# Patient Record
Sex: FEMALE | Race: WHITE | NOT HISPANIC OR LATINO | Employment: FULL TIME | URBAN - METROPOLITAN AREA
[De-identification: names, ages, dates, MRNs, and addresses within clinical notes are randomized per-mention and may not be internally consistent; named-entity substitution may affect disease eponyms.]

---

## 2020-07-19 ENCOUNTER — HOSPITAL ENCOUNTER (OUTPATIENT)
Facility: CLINIC | Age: 20
Setting detail: OBSERVATION
Discharge: HOME | End: 2020-07-20
Attending: EMERGENCY MEDICINE | Admitting: FAMILY MEDICINE
Payer: COMMERCIAL

## 2020-07-19 ENCOUNTER — APPOINTMENT (OUTPATIENT)
Dept: CT IMAGING | Facility: CLINIC | Age: 20
End: 2020-07-19
Attending: EMERGENCY MEDICINE
Payer: COMMERCIAL

## 2020-07-19 DIAGNOSIS — N13.2 HYDRONEPHROSIS WITH RENAL AND URETERAL CALCULUS OBSTRUCTION: ICD-10-CM

## 2020-07-19 DIAGNOSIS — N20.1 URETEROLITHIASIS: ICD-10-CM

## 2020-07-19 DIAGNOSIS — N12 PYELONEPHRITIS: ICD-10-CM

## 2020-07-19 DIAGNOSIS — N13.6 PYONEPHROSIS: ICD-10-CM

## 2020-07-19 DIAGNOSIS — Z20.822 COVID-19 RULED OUT BY LABORATORY TESTING: ICD-10-CM

## 2020-07-19 LAB
ALBUMIN UR-MCNC: 30 MG/DL
ANION GAP SERPL CALCULATED.3IONS-SCNC: 8 MMOL/L (ref 3–14)
APPEARANCE UR: CLEAR
BACTERIA #/AREA URNS HPF: ABNORMAL /HPF
BASOPHILS # BLD AUTO: 0 10E9/L (ref 0–0.2)
BASOPHILS NFR BLD AUTO: 0 %
BILIRUB UR QL STRIP: NEGATIVE
BUN SERPL-MCNC: 16 MG/DL (ref 7–30)
CALCIUM SERPL-MCNC: 8.5 MG/DL (ref 8.5–10.1)
CHLORIDE SERPL-SCNC: 106 MMOL/L (ref 94–109)
CO2 SERPL-SCNC: 26 MMOL/L (ref 20–32)
COLOR UR AUTO: YELLOW
CREAT SERPL-MCNC: 1.22 MG/DL (ref 0.52–1.04)
DIFFERENTIAL METHOD BLD: ABNORMAL
EOSINOPHIL # BLD AUTO: 0.1 10E9/L (ref 0–0.7)
EOSINOPHIL NFR BLD AUTO: 0.4 %
ERYTHROCYTE [DISTWIDTH] IN BLOOD BY AUTOMATED COUNT: 12.7 % (ref 10–15)
GFR SERPL CREATININE-BSD FRML MDRD: 63 ML/MIN/{1.73_M2}
GLUCOSE SERPL-MCNC: 97 MG/DL (ref 70–99)
GLUCOSE UR STRIP-MCNC: NEGATIVE MG/DL
HCG SERPL QL: NEGATIVE
HCT VFR BLD AUTO: 40.3 % (ref 35–47)
HGB BLD-MCNC: 13 G/DL (ref 11.7–15.7)
HGB UR QL STRIP: ABNORMAL
IMM GRANULOCYTES # BLD: 0 10E9/L (ref 0–0.4)
IMM GRANULOCYTES NFR BLD: 0.3 %
KETONES UR STRIP-MCNC: NEGATIVE MG/DL
LABORATORY COMMENT REPORT: NORMAL
LACTATE BLD-SCNC: 0.9 MMOL/L (ref 0.7–2)
LEUKOCYTE ESTERASE UR QL STRIP: NEGATIVE
LYMPHOCYTES # BLD AUTO: 1.4 10E9/L (ref 0.8–5.3)
LYMPHOCYTES NFR BLD AUTO: 9.2 %
MCH RBC QN AUTO: 29.1 PG (ref 26.5–33)
MCHC RBC AUTO-ENTMCNC: 32.3 G/DL (ref 31.5–36.5)
MCV RBC AUTO: 90 FL (ref 78–100)
MONOCYTES # BLD AUTO: 1 10E9/L (ref 0–1.3)
MONOCYTES NFR BLD AUTO: 6.6 %
MUCOUS THREADS #/AREA URNS LPF: PRESENT /LPF
NEUTROPHILS # BLD AUTO: 12.8 10E9/L (ref 1.6–8.3)
NEUTROPHILS NFR BLD AUTO: 83.5 %
NITRATE UR QL: NEGATIVE
NRBC # BLD AUTO: 0 10*3/UL
NRBC BLD AUTO-RTO: 0 /100
PH UR STRIP: 5.5 PH (ref 5–7)
PLATELET # BLD AUTO: 343 10E9/L (ref 150–450)
POTASSIUM SERPL-SCNC: 4.1 MMOL/L (ref 3.4–5.3)
RBC # BLD AUTO: 4.47 10E12/L (ref 3.8–5.2)
RBC #/AREA URNS AUTO: 26 /HPF (ref 0–2)
SARS-COV-2 RNA SPEC QL NAA+PROBE: NEGATIVE
SARS-COV-2 RNA SPEC QL NAA+PROBE: NORMAL
SODIUM SERPL-SCNC: 140 MMOL/L (ref 133–144)
SOURCE: ABNORMAL
SP GR UR STRIP: 1.03 (ref 1–1.03)
SPECIMEN SOURCE: NORMAL
SPECIMEN SOURCE: NORMAL
TRANS CELLS #/AREA URNS HPF: <1 /HPF (ref 0–1)
UROBILINOGEN UR STRIP-MCNC: NORMAL MG/DL (ref 0–2)
WBC # BLD AUTO: 15.3 10E9/L (ref 4–11)
WBC #/AREA URNS AUTO: 6 /HPF (ref 0–5)

## 2020-07-19 PROCEDURE — 25000128 H RX IP 250 OP 636: Performed by: EMERGENCY MEDICINE

## 2020-07-19 PROCEDURE — 83605 ASSAY OF LACTIC ACID: CPT | Performed by: EMERGENCY MEDICINE

## 2020-07-19 PROCEDURE — 96365 THER/PROPH/DIAG IV INF INIT: CPT | Performed by: EMERGENCY MEDICINE

## 2020-07-19 PROCEDURE — 81001 URINALYSIS AUTO W/SCOPE: CPT | Performed by: EMERGENCY MEDICINE

## 2020-07-19 PROCEDURE — 96375 TX/PRO/DX INJ NEW DRUG ADDON: CPT

## 2020-07-19 PROCEDURE — 25000128 H RX IP 250 OP 636: Performed by: FAMILY MEDICINE

## 2020-07-19 PROCEDURE — 87088 URINE BACTERIA CULTURE: CPT | Performed by: EMERGENCY MEDICINE

## 2020-07-19 PROCEDURE — 80048 BASIC METABOLIC PNL TOTAL CA: CPT | Performed by: EMERGENCY MEDICINE

## 2020-07-19 PROCEDURE — 85025 COMPLETE CBC W/AUTO DIFF WBC: CPT | Performed by: EMERGENCY MEDICINE

## 2020-07-19 PROCEDURE — 96366 THER/PROPH/DIAG IV INF ADDON: CPT | Performed by: EMERGENCY MEDICINE

## 2020-07-19 PROCEDURE — U0003 INFECTIOUS AGENT DETECTION BY NUCLEIC ACID (DNA OR RNA); SEVERE ACUTE RESPIRATORY SYNDROME CORONAVIRUS 2 (SARS-COV-2) (CORONAVIRUS DISEASE [COVID-19]), AMPLIFIED PROBE TECHNIQUE, MAKING USE OF HIGH THROUGHPUT TECHNOLOGIES AS DESCRIBED BY CMS-2020-01-R: HCPCS | Performed by: STUDENT IN AN ORGANIZED HEALTH CARE EDUCATION/TRAINING PROGRAM

## 2020-07-19 PROCEDURE — 25000132 ZZH RX MED GY IP 250 OP 250 PS 637: Performed by: NURSE PRACTITIONER

## 2020-07-19 PROCEDURE — 25800030 ZZH RX IP 258 OP 636: Performed by: NURSE PRACTITIONER

## 2020-07-19 PROCEDURE — 74176 CT ABD & PELVIS W/O CONTRAST: CPT

## 2020-07-19 PROCEDURE — 87186 SC STD MICRODIL/AGAR DIL: CPT | Performed by: EMERGENCY MEDICINE

## 2020-07-19 PROCEDURE — 99285 EMERGENCY DEPT VISIT HI MDM: CPT | Mod: 25 | Performed by: EMERGENCY MEDICINE

## 2020-07-19 PROCEDURE — 87086 URINE CULTURE/COLONY COUNT: CPT | Performed by: EMERGENCY MEDICINE

## 2020-07-19 PROCEDURE — 99220 ZZC INITIAL OBSERVATION CARE,LEVL III: CPT | Mod: Z6 | Performed by: NURSE PRACTITIONER

## 2020-07-19 PROCEDURE — C9803 HOPD COVID-19 SPEC COLLECT: HCPCS | Performed by: EMERGENCY MEDICINE

## 2020-07-19 PROCEDURE — 25000128 H RX IP 250 OP 636: Performed by: NURSE PRACTITIONER

## 2020-07-19 PROCEDURE — 96376 TX/PRO/DX INJ SAME DRUG ADON: CPT | Performed by: EMERGENCY MEDICINE

## 2020-07-19 PROCEDURE — 96361 HYDRATE IV INFUSION ADD-ON: CPT | Performed by: EMERGENCY MEDICINE

## 2020-07-19 PROCEDURE — G0378 HOSPITAL OBSERVATION PER HR: HCPCS

## 2020-07-19 PROCEDURE — 96361 HYDRATE IV INFUSION ADD-ON: CPT

## 2020-07-19 PROCEDURE — 25000132 ZZH RX MED GY IP 250 OP 250 PS 637: Performed by: FAMILY MEDICINE

## 2020-07-19 PROCEDURE — 25800030 ZZH RX IP 258 OP 636: Performed by: EMERGENCY MEDICINE

## 2020-07-19 PROCEDURE — 84703 CHORIONIC GONADOTROPIN ASSAY: CPT | Performed by: EMERGENCY MEDICINE

## 2020-07-19 PROCEDURE — 96375 TX/PRO/DX INJ NEW DRUG ADDON: CPT | Performed by: EMERGENCY MEDICINE

## 2020-07-19 RX ORDER — ACETAMINOPHEN 650 MG/1
650 SUPPOSITORY RECTAL EVERY 4 HOURS PRN
Status: DISCONTINUED | OUTPATIENT
Start: 2020-07-19 | End: 2020-07-20 | Stop reason: HOSPADM

## 2020-07-19 RX ORDER — ACETAMINOPHEN 325 MG/1
650 TABLET ORAL EVERY 4 HOURS PRN
Status: DISCONTINUED | OUTPATIENT
Start: 2020-07-19 | End: 2020-07-20 | Stop reason: HOSPADM

## 2020-07-19 RX ORDER — SODIUM CHLORIDE 9 MG/ML
INJECTION, SOLUTION INTRAVENOUS CONTINUOUS
Status: DISCONTINUED | OUTPATIENT
Start: 2020-07-19 | End: 2020-07-20 | Stop reason: HOSPADM

## 2020-07-19 RX ORDER — HYDROMORPHONE HYDROCHLORIDE 1 MG/ML
0.5 INJECTION, SOLUTION INTRAMUSCULAR; INTRAVENOUS; SUBCUTANEOUS
Status: DISCONTINUED | OUTPATIENT
Start: 2020-07-19 | End: 2020-07-19 | Stop reason: CLARIF

## 2020-07-19 RX ORDER — TAMSULOSIN HYDROCHLORIDE 0.4 MG/1
0.4 CAPSULE ORAL DAILY
Status: DISCONTINUED | OUTPATIENT
Start: 2020-07-20 | End: 2020-07-19

## 2020-07-19 RX ORDER — ONDANSETRON 2 MG/ML
4 INJECTION INTRAMUSCULAR; INTRAVENOUS EVERY 6 HOURS PRN
Status: DISCONTINUED | OUTPATIENT
Start: 2020-07-19 | End: 2020-07-20 | Stop reason: HOSPADM

## 2020-07-19 RX ORDER — HYDROMORPHONE HCL/0.9% NACL/PF 0.2MG/0.2
0.2 SYRINGE (ML) INTRAVENOUS EVERY 4 HOURS PRN
Status: COMPLETED | OUTPATIENT
Start: 2020-07-19 | End: 2020-07-20

## 2020-07-19 RX ORDER — HYDROMORPHONE HYDROCHLORIDE 1 MG/ML
0.5 INJECTION, SOLUTION INTRAMUSCULAR; INTRAVENOUS; SUBCUTANEOUS
Status: COMPLETED | OUTPATIENT
Start: 2020-07-19 | End: 2020-07-19

## 2020-07-19 RX ORDER — ONDANSETRON 4 MG/1
4 TABLET, ORALLY DISINTEGRATING ORAL EVERY 6 HOURS PRN
Status: DISCONTINUED | OUTPATIENT
Start: 2020-07-19 | End: 2020-07-20 | Stop reason: HOSPADM

## 2020-07-19 RX ORDER — HYDROMORPHONE HYDROCHLORIDE 1 MG/ML
0.5 INJECTION, SOLUTION INTRAMUSCULAR; INTRAVENOUS; SUBCUTANEOUS ONCE
Status: COMPLETED | OUTPATIENT
Start: 2020-07-19 | End: 2020-07-19

## 2020-07-19 RX ORDER — ONDANSETRON 2 MG/ML
4 INJECTION INTRAMUSCULAR; INTRAVENOUS EVERY 30 MIN PRN
Status: DISCONTINUED | OUTPATIENT
Start: 2020-07-19 | End: 2020-07-19

## 2020-07-19 RX ORDER — KETOROLAC TROMETHAMINE 30 MG/ML
30 INJECTION, SOLUTION INTRAMUSCULAR; INTRAVENOUS EVERY 6 HOURS PRN
Status: DISCONTINUED | OUTPATIENT
Start: 2020-07-19 | End: 2020-07-20 | Stop reason: HOSPADM

## 2020-07-19 RX ORDER — KETOROLAC TROMETHAMINE 30 MG/ML
30 INJECTION, SOLUTION INTRAMUSCULAR; INTRAVENOUS EVERY 6 HOURS PRN
Status: DISCONTINUED | OUTPATIENT
Start: 2020-07-19 | End: 2020-07-19

## 2020-07-19 RX ORDER — HYDROCODONE BITARTRATE AND ACETAMINOPHEN 5; 325 MG/1; MG/1
1 TABLET ORAL EVERY 6 HOURS PRN
COMMUNITY
End: 2020-10-06

## 2020-07-19 RX ORDER — TAMSULOSIN HYDROCHLORIDE 0.4 MG/1
0.4 CAPSULE ORAL DAILY
Status: DISCONTINUED | OUTPATIENT
Start: 2020-07-19 | End: 2020-07-20 | Stop reason: HOSPADM

## 2020-07-19 RX ORDER — NALOXONE HYDROCHLORIDE 0.4 MG/ML
.1-.4 INJECTION, SOLUTION INTRAMUSCULAR; INTRAVENOUS; SUBCUTANEOUS
Status: DISCONTINUED | OUTPATIENT
Start: 2020-07-19 | End: 2020-07-20 | Stop reason: HOSPADM

## 2020-07-19 RX ORDER — LIDOCAINE 40 MG/G
CREAM TOPICAL
Status: DISCONTINUED | OUTPATIENT
Start: 2020-07-19 | End: 2020-07-20 | Stop reason: HOSPADM

## 2020-07-19 RX ORDER — CEFTRIAXONE 1 G/1
1 INJECTION, POWDER, FOR SOLUTION INTRAMUSCULAR; INTRAVENOUS ONCE
Status: COMPLETED | OUTPATIENT
Start: 2020-07-19 | End: 2020-07-19

## 2020-07-19 RX ADMIN — SODIUM CHLORIDE 1000 ML: 9 INJECTION, SOLUTION INTRAVENOUS at 07:55

## 2020-07-19 RX ADMIN — ACETAMINOPHEN 650 MG: 325 TABLET, FILM COATED ORAL at 18:47

## 2020-07-19 RX ADMIN — SODIUM CHLORIDE: 9 INJECTION, SOLUTION INTRAVENOUS at 09:17

## 2020-07-19 RX ADMIN — ACETAMINOPHEN 650 MG: 325 TABLET, FILM COATED ORAL at 22:22

## 2020-07-19 RX ADMIN — TAMSULOSIN HYDROCHLORIDE 0.4 MG: 0.4 CAPSULE ORAL at 13:54

## 2020-07-19 RX ADMIN — ONDANSETRON 4 MG: 2 INJECTION INTRAMUSCULAR; INTRAVENOUS at 07:46

## 2020-07-19 RX ADMIN — SODIUM CHLORIDE: 9 INJECTION, SOLUTION INTRAVENOUS at 17:21

## 2020-07-19 RX ADMIN — CEFTRIAXONE SODIUM 1 G: 1 INJECTION, POWDER, FOR SOLUTION INTRAMUSCULAR; INTRAVENOUS at 07:55

## 2020-07-19 RX ADMIN — HYDROMORPHONE HYDROCHLORIDE 0.5 MG: 1 INJECTION, SOLUTION INTRAMUSCULAR; INTRAVENOUS; SUBCUTANEOUS at 07:43

## 2020-07-19 RX ADMIN — SODIUM CHLORIDE: 9 INJECTION, SOLUTION INTRAVENOUS at 22:22

## 2020-07-19 RX ADMIN — KETOROLAC TROMETHAMINE 30 MG: 30 INJECTION, SOLUTION INTRAMUSCULAR at 18:48

## 2020-07-19 RX ADMIN — SODIUM CHLORIDE: 9 INJECTION, SOLUTION INTRAVENOUS at 19:04

## 2020-07-19 RX ADMIN — HYDROMORPHONE HYDROCHLORIDE 0.5 MG: 1 INJECTION, SOLUTION INTRAMUSCULAR; INTRAVENOUS; SUBCUTANEOUS at 13:01

## 2020-07-19 RX ADMIN — HYDROMORPHONE HYDROCHLORIDE 0.5 MG: 1 INJECTION, SOLUTION INTRAMUSCULAR; INTRAVENOUS; SUBCUTANEOUS at 08:25

## 2020-07-19 ASSESSMENT — PAIN DESCRIPTION - DESCRIPTORS: DESCRIPTORS: ACHING;DISCOMFORT

## 2020-07-19 ASSESSMENT — MIFFLIN-ST. JEOR: SCORE: 1435.4

## 2020-07-19 NOTE — ED NOTES
Sign out Provider: Yasmine      Sign out Plan: Patient seen on the earlier shift due to severe flank pain of 1 weeks duration, abnormal urinalysis, elevated white cell count.  CT abdomen pending at time of signout.      Reassessment: Patient is much more comfortable.  Labs reveal leukocytosis and slight elevation of her creatinine.  There is moderate bacteria and red and white cells in her urine, no other signs of infection.  Her CT shows a 2 mm distal ureteral stone with hydronephrosis and perinephric stranding.  Results discussed with patient and urology consulted.  Urology spoke with the patient in the ED.  She has not required another dose of Dilaudid.    Disposition: We will admit to the observation unit for gentle hydration, recheck of creatinine, monitoring of temperature and vital signs to be certain we are not developing an infection, analgesics, Flomax.  Urology to follow if she does not clinically improve.  I have spoken with the patient, the urology service, and the observation unit staff, and we are all in agreement.  Patient is stable for transfer to the [place].       Aren Juarez MD  07/19/20 7210

## 2020-07-19 NOTE — PROGRESS NOTES
"S:patient admit to obs for left ureterolithiais 2mm at left uvj. Patient with left flank tenderness. No fever reported. Patient notes pain is 5/10 currrently. No current nausea. Noted elevated creatinine. Urology did see patient in there ER. Plan for overnight treatment and potential stent if fever. Patient feels ok currently. fmhx stone father has had 2.   Patient had noted blood in urine today.  O:/85   Pulse 62   Temp 98.4  F (36.9  C) (Oral)   Resp 16   Ht 1.651 m (5' 5\")   Wt 66.5 kg (146 lb 8 oz)   LMP  (LMP Unknown)   SpO2 100%   BMI 24.38 kg/m    Patient nontoxic pain controlled. Left cva tenderness. Abdomen slight tenderness suprapubic. No rebound. Neuro nonfocal.    Results for orders placed or performed during the hospital encounter of 07/19/20   Abd/pelvis CT no contrast - Stone Protocol     Status: None    Narrative    CT ABDOMEN PELVIS W/O CONTRAST 7/19/2020 8:49 AM    CLINICAL HISTORY: Flank pain, stone disease suspected  TECHNIQUE: CT scan of the abdomen and pelvis was performed without IV  contrast. Multiplanar reformats were obtained. Dose reduction  techniques were used.  CONTRAST: None.    COMPARISON: None.    FINDINGS:   LOWER CHEST: Normal.    HEPATOBILIARY: Normal.    PANCREAS: Normal.    SPLEEN: Normal size.    ADRENAL GLANDS: Normal.    KIDNEYS/BLADDER: Mild left renal enlargement and perinephric edema.  Mild left hydronephrosis. There is a 2 mm distal left ureteral  calculus, just above the ureterovesical junction. There are a couple  of tiny nonobstructing right calyceal tip stones. No right  hydronephrosis. No left intrarenal calculi. The bladder is  decompressed.    BOWEL: Normal with no obstruction or acute inflammatory change.  Nothing for appendicitis.    LYMPH NODES: Normal.    VASCULATURE: Unremarkable.    PELVIC ORGANS: Normal.    OTHER: Trace free fluid in the pelvis is likely physiologic.    MUSCULOSKELETAL: Normal.      Impression    IMPRESSION:   1.  2 mm distal " left ureteral calculus, just above the ureterovesical  junction. Mild left hydronephrosis and left perinephric edema.  2.  Tiny nonobstructing right intrarenal calculi.    COLUMBA ASHTON MD   UA with Microscopic reflex to Culture     Status: Abnormal    Specimen: Urine Midstream; Midstream Urine   Result Value Ref Range    Color Urine Yellow     Appearance Urine Clear     Glucose Urine Negative NEG^Negative mg/dL    Bilirubin Urine Negative NEG^Negative    Ketones Urine Negative NEG^Negative mg/dL    Specific Gravity Urine 1.030 1.003 - 1.035    Blood Urine Small (A) NEG^Negative    pH Urine 5.5 5.0 - 7.0 pH    Protein Albumin Urine 30 (A) NEG^Negative mg/dL    Urobilinogen mg/dL Normal 0.0 - 2.0 mg/dL    Nitrite Urine Negative NEG^Negative    Leukocyte Esterase Urine Negative NEG^Negative    Source Midstream Urine     WBC Urine 6 (H) 0 - 5 /HPF    RBC Urine 26 (H) 0 - 2 /HPF    Bacteria Urine Moderate (A) NEG^Negative /HPF    Transitional Epi <1 0 - 1 /HPF    Mucous Urine Present (A) NEG^Negative /LPF   CBC with platelets differential     Status: Abnormal   Result Value Ref Range    WBC 15.3 (H) 4.0 - 11.0 10e9/L    RBC Count 4.47 3.8 - 5.2 10e12/L    Hemoglobin 13.0 11.7 - 15.7 g/dL    Hematocrit 40.3 35.0 - 47.0 %    MCV 90 78 - 100 fl    MCH 29.1 26.5 - 33.0 pg    MCHC 32.3 31.5 - 36.5 g/dL    RDW 12.7 10.0 - 15.0 %    Platelet Count 343 150 - 450 10e9/L    Diff Method Automated Method     % Neutrophils 83.5 %    % Lymphocytes 9.2 %    % Monocytes 6.6 %    % Eosinophils 0.4 %    % Basophils 0.0 %    % Immature Granulocytes 0.3 %    Nucleated RBCs 0 0 /100    Absolute Neutrophil 12.8 (H) 1.6 - 8.3 10e9/L    Absolute Lymphocytes 1.4 0.8 - 5.3 10e9/L    Absolute Monocytes 1.0 0.0 - 1.3 10e9/L    Absolute Eosinophils 0.1 0.0 - 0.7 10e9/L    Absolute Basophils 0.0 0.0 - 0.2 10e9/L    Abs Immature Granulocytes 0.0 0 - 0.4 10e9/L    Absolute Nucleated RBC 0.0    Basic metabolic panel     Status: Abnormal   Result  Value Ref Range    Sodium 140 133 - 144 mmol/L    Potassium 4.1 3.4 - 5.3 mmol/L    Chloride 106 94 - 109 mmol/L    Carbon Dioxide 26 20 - 32 mmol/L    Anion Gap 8 3 - 14 mmol/L    Glucose 97 70 - 99 mg/dL    Urea Nitrogen 16 7 - 30 mg/dL    Creatinine 1.22 (H) 0.52 - 1.04 mg/dL    GFR Estimate 63 >60 mL/min/[1.73_m2]    GFR Estimate If Black 73 >60 mL/min/[1.73_m2]    Calcium 8.5 8.5 - 10.1 mg/dL   Lactic acid whole blood     Status: None   Result Value Ref Range    Lactic Acid 0.9 0.7 - 2.0 mmol/L   HCG qualitative     Status: None   Result Value Ref Range    HCG Qualitative Serum Negative NEG^Negative   Asymptomatic COVID-19 Virus (Coronavirus) by PCR     Status: None    Specimen: Nasopharyngeal   Result Value Ref Range    COVID-19 Virus PCR to [facility] - Source Nasopharyngeal     COVID-19 Virus PCR to [facility] - Result       Test received-See reflex to IDDL test SARS CoV2 (COVID-19) Virus RT-PCR   SARS-CoV-2 COVID-19 Virus (Coronavirus) RT-PCR Nasopharyngeal     Status: None    Specimen: Nasopharyngeal   Result Value Ref Range    SARS-CoV-2 Virus Specimen Source Nasopharyngeal     SARS-CoV-2 PCR Result NEGATIVE     SARS-CoV-2 PCR Comment       Testing was performed using the Xpert Xpress SARS-CoV-2 Assay on the Cepheid Gene-Xpert   Instrument Systems. Additional information about this Emergency Use Authorization (EUA)   assay can be found via the Lab Guide.         A:left ureterolithiasis with mild hydro. Patient given ceftriaxone iv x 1.      Creatinine slight elevated 1.22  P:IV fluids and dilaudid for pain (avoid toradol with creatinine elevated.) flomax, zofran etc.     Urology to follow and monitor sx for any sign of infection.

## 2020-07-19 NOTE — ED PROVIDER NOTES
ED Provider Note  [facility]      History     Chief Complaint   Patient presents with     Flank Pain     c/o left flank pain. Onset was a week ago.      Dysuria     c/o reported burning pain when urinating. Onset 2 days ago. Noted blood in urine this morning.     KEITH Walton is a 19 year old female who presenting with severe left back, flank and left lower quadrant pain.  1 week ago she developed sudden onset of pain, which woke her from sleep.  She was seen at an outside hospital later that day because the pain persisted.  She was diagnosed with musculoskeletal pain and prescribed a combination of opiate analgesics and muscle relaxers.  Her pain did improve and 3 days ago was controlled with no analgesics.  Yesterday her pain suddenly returned and was very severe and progressed and now she is here for further evaluation today.  This morning also experienced hematuria as well as dysuria which she did not previously have.    No subjective fevers chest pain or shortness of breath.  No vomiting but is nauseous no diarrhea  Took Vicodin prior to presentation to help with pain, minimal response    No history of ureteral colic  Father has kidney stones    Past Medical History  History reviewed. No pertinent past medical history.  History reviewed. No pertinent surgical history.  No current outpatient medications on file.    Allergies   Allergen Reactions     Chlorhexidine Rash     Past medical history, past surgical history, medications, and allergies were reviewed with the patient. Additional pertinent items: None    Family History  History reviewed. No pertinent family history.  Family history was reviewed with the patient. Additional pertinent items: None    Social History  Social History     Tobacco Use     Smoking status: Never Smoker     Smokeless tobacco: Never Used   Substance Use Topics     Alcohol use: Yes     Comment: couple drinks a week     Drug use: Never      Social history  "was reviewed with the patient. Additional pertinent items: None    Review of Systems  A complete review of systems was performed with pertinent positives and negatives noted in the HPI, and all other systems negative.    Physical Exam   BP: (!) 141/81  Pulse: 88  Temp: 97.3  F (36.3  C)  Resp: 18  Height: 165.1 cm (5' 5\")  Weight: 66.5 kg (146 lb 8 oz)  SpO2: 97 %  Physical Exam  GEN: Well appearing but in pain, non toxic, cooperative and conversant.   HEENT: The head is normocephalic and atraumatic. Pupils are equal round and reactive to light. Extraocular motions are intact. There is no facial swelling.   CV: Regular rate   PULM: Unlabored breathing   Back: Left-sided CVA tenderness to palpation  Abdomen left lower quadrant tenderness to palpation and left flank tenderness to palpation.  EXT: Full range of motion.  No edema.  NEURO: Cranial nerves II through XII are intact and symmetric. Bilateral upper and lower extremities grossly show full range of motion without any focal deficits.     PSYCH: Calm and cooperative, interactive.     ED Course      Procedures             Results for orders placed or performed during the hospital encounter of 07/19/20   Abd/pelvis CT no contrast - Stone Protocol     Status: None    Narrative    CT ABDOMEN PELVIS W/O CONTRAST 7/19/2020 8:49 AM    CLINICAL HISTORY: Flank pain, stone disease suspected  TECHNIQUE: CT scan of the abdomen and pelvis was performed without IV  contrast. Multiplanar reformats were obtained. Dose reduction  techniques were used.  CONTRAST: None.    COMPARISON: None.    FINDINGS:   LOWER CHEST: Normal.    HEPATOBILIARY: Normal.    PANCREAS: Normal.    SPLEEN: Normal size.    ADRENAL GLANDS: Normal.    KIDNEYS/BLADDER: Mild left renal enlargement and perinephric edema.  Mild left hydronephrosis. There is a 2 mm distal left ureteral  calculus, just above the ureterovesical junction. There are a couple  of tiny nonobstructing right calyceal tip stones. No " right  hydronephrosis. No left intrarenal calculi. The bladder is  decompressed.    BOWEL: Normal with no obstruction or acute inflammatory change.  Nothing for appendicitis.    LYMPH NODES: Normal.    VASCULATURE: Unremarkable.    PELVIC ORGANS: Normal.    OTHER: Trace free fluid in the pelvis is likely physiologic.    MUSCULOSKELETAL: Normal.      Impression    IMPRESSION:   1.  2 mm distal left ureteral calculus, just above the ureterovesical  junction. Mild left hydronephrosis and left perinephric edema.  2.  Tiny nonobstructing right intrarenal calculi.    COLUMBA ASHTON MD   UA with Microscopic reflex to Culture     Status: Abnormal    Specimen: Urine Midstream; Midstream Urine   Result Value Ref Range    Color Urine Yellow     Appearance Urine Clear     Glucose Urine Negative NEG^Negative mg/dL    Bilirubin Urine Negative NEG^Negative    Ketones Urine Negative NEG^Negative mg/dL    Specific Gravity Urine 1.030 1.003 - 1.035    Blood Urine Small (A) NEG^Negative    pH Urine 5.5 5.0 - 7.0 pH    Protein Albumin Urine 30 (A) NEG^Negative mg/dL    Urobilinogen mg/dL Normal 0.0 - 2.0 mg/dL    Nitrite Urine Negative NEG^Negative    Leukocyte Esterase Urine Negative NEG^Negative    Source Midstream Urine     WBC Urine 6 (H) 0 - 5 /HPF    RBC Urine 26 (H) 0 - 2 /HPF    Bacteria Urine Moderate (A) NEG^Negative /HPF    Transitional Epi <1 0 - 1 /HPF    Mucous Urine Present (A) NEG^Negative /LPF   CBC with platelets differential     Status: Abnormal   Result Value Ref Range    WBC 15.3 (H) 4.0 - 11.0 10e9/L    RBC Count 4.47 3.8 - 5.2 10e12/L    Hemoglobin 13.0 11.7 - 15.7 g/dL    Hematocrit 40.3 35.0 - 47.0 %    MCV 90 78 - 100 fl    MCH 29.1 26.5 - 33.0 pg    MCHC 32.3 31.5 - 36.5 g/dL    RDW 12.7 10.0 - 15.0 %    Platelet Count 343 150 - 450 10e9/L    Diff Method Automated Method     % Neutrophils 83.5 %    % Lymphocytes 9.2 %    % Monocytes 6.6 %    % Eosinophils 0.4 %    % Basophils 0.0 %    % Immature Granulocytes  0.3 %    Nucleated RBCs 0 0 /100    Absolute Neutrophil 12.8 (H) 1.6 - 8.3 10e9/L    Absolute Lymphocytes 1.4 0.8 - 5.3 10e9/L    Absolute Monocytes 1.0 0.0 - 1.3 10e9/L    Absolute Eosinophils 0.1 0.0 - 0.7 10e9/L    Absolute Basophils 0.0 0.0 - 0.2 10e9/L    Abs Immature Granulocytes 0.0 0 - 0.4 10e9/L    Absolute Nucleated RBC 0.0    Basic metabolic panel     Status: Abnormal   Result Value Ref Range    Sodium 140 133 - 144 mmol/L    Potassium 4.1 3.4 - 5.3 mmol/L    Chloride 106 94 - 109 mmol/L    Carbon Dioxide 26 20 - 32 mmol/L    Anion Gap 8 3 - 14 mmol/L    Glucose 97 70 - 99 mg/dL    Urea Nitrogen 16 7 - 30 mg/dL    Creatinine 1.22 (H) 0.52 - 1.04 mg/dL    GFR Estimate 63 >60 mL/min/[1.73_m2]    GFR Estimate If Black 73 >60 mL/min/[1.73_m2]    Calcium 8.5 8.5 - 10.1 mg/dL   Lactic acid whole blood     Status: None   Result Value Ref Range    Lactic Acid 0.9 0.7 - 2.0 mmol/L   HCG qualitative     Status: None   Result Value Ref Range    HCG Qualitative Serum Negative NEG^Negative   Asymptomatic COVID-19 Virus (Coronavirus) by PCR     Status: None    Specimen: Nasopharyngeal   Result Value Ref Range    COVID-19 Virus PCR to [facility] - Source Nasopharyngeal     COVID-19 Virus PCR to [facility] - Result       Test received-See reflex to IDDL test SARS CoV2 (COVID-19) Virus RT-PCR   SARS-CoV-2 COVID-19 Virus (Coronavirus) RT-PCR Nasopharyngeal     Status: None    Specimen: Nasopharyngeal   Result Value Ref Range    SARS-CoV-2 Virus Specimen Source Nasopharyngeal     SARS-CoV-2 PCR Result NEGATIVE     SARS-CoV-2 PCR Comment       Testing was performed using the Xpert Xpress SARS-CoV-2 Assay on the Cepheid Gene-Xpert   Instrument Systems. Additional information about this Emergency Use Authorization (EUA)   assay can be found via the Lab Guide.     CBC with platelets     Status: Abnormal   Result Value Ref Range    WBC 4.7 4.0 - 11.0 10e9/L    RBC Count 4.11 3.8 - 5.2 10e12/L    Hemoglobin 11.8 11.7 - 15.7 g/dL     Hematocrit 37.8 35.0 - 47.0 %    MCV 92 78 - 100 fl    MCH 28.7 26.5 - 33.0 pg    MCHC 31.2 (L) 31.5 - 36.5 g/dL    RDW 12.7 10.0 - 15.0 %    Platelet Count 255 150 - 450 10e9/L     Medications   0.9% sodium chloride BOLUS (0 mLs Intravenous Stopped 7/19/20 1010)     Followed by   sodium chloride 0.9% infusion ( Intravenous Stopped 7/19/20 1906)   tamsulosin (FLOMAX) capsule 0.4 mg (0.4 mg Oral Given 7/19/20 1354)   lidocaine 1 % 0.1-1 mL (has no administration in time range)   lidocaine (LMX4) cream (has no administration in time range)   sodium chloride (PF) 0.9% PF flush 3 mL (has no administration in time range)   sodium chloride (PF) 0.9% PF flush 3 mL (3 mLs Intracatheter Not Given 7/20/20 0507)   ondansetron (ZOFRAN-ODT) ODT tab 4 mg (has no administration in time range)     Or   ondansetron (ZOFRAN) injection 4 mg (has no administration in time range)   acetaminophen (TYLENOL) tablet 650 mg (650 mg Oral Given 7/19/20 2222)   acetaminophen (TYLENOL) Suppository 650 mg (has no administration in time range)   naloxone (NARCAN) injection 0.1-0.4 mg (has no administration in time range)   ketorolac (TORADOL) injection 30 mg (30 mg Intravenous Given 7/19/20 1848)   HYDROmorphone (DILAUDID) injection 0.2 mg (has no administration in time range)   sodium chloride 0.9% infusion ( Intravenous New Bag 7/20/20 0459)   cefTRIAXone (ROCEPHIN) 1 g vial to attach to  mL bag for ADULTS or NS 50 mL bag for PEDS (0 g Intravenous Stopped 7/19/20 1012)   HYDROmorphone (PF) (DILAUDID) injection 0.5 mg (0.5 mg Intravenous Given 7/19/20 0743)   HYDROmorphone (PF) (DILAUDID) injection 0.5 mg (0.5 mg Intravenous Given 7/19/20 0825)        Assessments & Plan (with Medical Decision Making)   19-year-old female presenting with left low back, flank tenderness, hematuria and increasing pain.    DDX includes renal stone/ureteral colic, pyelonephritis, UTI, infected stone, bowel obstruction, diverticulitis, ovarian torsion, ectopic  pregnancy, TOA, PID, among other causes    Clinically the patient is quite uncomfortable appearing but nontoxic.  IV access, analgesics, fluids, laboratory studies including urinalysis, and CT stone protocol ordered and pending    In response to IV analgesics the patient's pain did improve.  She signed out to the morning attending with the work-up above as ordered pending.  My suspicion high for ureteral stone as well as possible infected stone given the duration of symptoms and dysuria.  For this reason ordered ceftriaxone 1 g IV now pending further work-up        I have reviewed the nursing notes. I have reviewed the findings, diagnosis, plan and need for follow up with the patient.    Current Discharge Medication List          Final diagnoses:   Ureterolithiasis       --  Rey Underwood MD   Emergency Medicine   [facility], [place], EMERGENCY DEPARTMENT  7/19/2020     Rey Underwood MD  07/20/20 0649

## 2020-07-19 NOTE — ED NOTES
Observation Brochure and Video    Patient and family informed of observation status based on provider's order.  Observation brochure was given and the video watched. Patient/Family stated understanding. Questions answered.  SCOTT SOTOMAYOR RN

## 2020-07-19 NOTE — H&P
[facility], [place]    History and Physical - Hospitalist Service       Date of Admission:  7/19/2020    Assessment & Plan   Josefina Walton is a 19 year old female admitted on 7/19/2020. She has no past medical history. She presents to the ED with  left low back, flank tenderness, hematuria and increasing pain.      1. 2 mm distal left ureteral stone: Patient presents to the ED with severe left back, flank and left lower quadrant pain x 1 week. The pain started suddenly one week ago and woke her out of sleep.Her pain persisted and she went to an outside ED. It was thought to be musculoskeletal pain and she was prescribed muscle relaxers.  Yesterday,  her pain suddenly returned and was very severe and progressed and now she is here for further evaluation today.  This morning also experienced hematuria as well as dysuria which she did not previously have. She denies any fevers, chills, shortness of breath. vomiting or diarrhea. She is nauseated. She took Vicodin prior to presentation to help with pain with minimal response. In the ED: Vitals:BP: 126/96 Pulse: 55 Temp: 97.3 Resp: 18 SP02:100 % Labs: Na 140, K 4.1, Cr 1.22, GFR 63, BUN 16, Lactic acid  0.9, BG 97, HCG negative, WBC 15.3, Abs neuts 12.8, Hgb 13.0, Plt 343, UA negative, COVID negative. Medications: Ceftriaxone 1 g IV x 1, Dilaudid 0.5 mg IV x 2, 1,000 ml IV bolus x 1, NS at 125/hr, Tamsulosin 0.4 mg po x 1,  Imaging: Abdominal/Pelvis CT :  2 mm distal left ureteral calculus, just above the ureterovesical junction. Mild left hydronephrosis and left perinephric edema.  Tiny nonobstructing right intrarenal calculi. Consults: Urology consulted in the ED. Leukocytosis likely a robust stress response as patient is afebrile. No evidence of UTI; leukocytosis likely a robust stress response as patient is afebrile. This does not appear to be an infected stone. Unclear what is causing her mild LINDA. Not recommending intervention at  "this time as she is likely to pass the stones. Would reconsider if signs of infection or poor pain control, inability to tolerate PO.Plan: Admit to ED observation for symptom management.  - Admit to ED obs for hydration and repeat labs in AM  - NS at 125/hr  - Repeat CBC,BMP in am   - No indications for stenting at this time  - Continue  IV dilaudid, transition to Oxycodone when pain is improved  - Continue  tamsulosin daily  - straine urine  - Stone analysis if stone is passed   - Page urology if she shows signs of infection, becomes hemodynamically unstable, or if pain becomes uncontrollable  - NPO at midnight if she would need a stent in am   - Outpatient referral for stone clinic with nephrology/urology.     Diet: Regular diet/NPO at midnight   DVT Prophylaxis: Low Risk/Ambulatory with no VTE prophylaxis indicated  [place] Catheter: not present  Code Status: Full         Disposition Plan   Expected discharge: Tomorrow, recommended to prior living arrangement once adequate pain management/ tolerating PO medications.  Entered: EUGENE Hagen CNP 07/19/2020, 2:24 PM     The patient's care was discussed with the Attending Physician, Dr. Westbrook and Patient.    EUGENE Hagen CNP  [facility], [place]    ______________________________________________________________________    Chief Complaint   Left low back, flank tenderness, hematuria and increasing pain.    History of Present Illness   Per ED note, \"Josefina Walton is a 19 year old female who presenting with severe left back, flank and left lower quadrant pain.  1 week ago she developed sudden onset of pain, which woke her from sleep.  She was seen at an outside hospital later that day because the pain persisted.  She was diagnosed with musculoskeletal pain and prescribed a combination of opiate analgesics and muscle relaxers.  Her pain did improve and 3 days ago was controlled with no analgesics.  Yesterday her pain suddenly " "returned and was very severe and progressed and now she is here for further evaluation today.  This morning also experienced hematuria as well as dysuria which she did not previously have.     No subjective fevers chest pain or shortness of breath.  No vomiting but is nauseous no diarrhea  Took Vicodin prior to presentation to help with pain, minimal response. \"    Review of Systems    The 10 point Review of Systems is negative other than noted in the HPI or here. Left low back, flank tenderness, hematuria and increasing pain.    Past Medical History    I have reviewed this patient's medical history and updated it with pertinent information if needed.   History reviewed. No pertinent past medical history.    Past Surgical History   I have reviewed this patient's surgical history and updated it with pertinent information if needed.  History reviewed. No pertinent surgical history.    Social History   I have reviewed this patient's social history and updated it with pertinent information if needed.      Family History   Father has had kidney stones    Prior to Admission Medications   Prior to Admission Medications   Prescriptions Last Dose Informant Patient Reported? Taking?   HYDROcodone-acetaminophen (NORCO) 5-325 MG tablet 7/19/2020 at 0530  Yes Yes   Sig: Take 1 tablet by mouth every 6 hours as needed for severe pain      Facility-Administered Medications: None     Allergies   Allergies   Allergen Reactions     Chlorhexidine Rash       Physical Exam   Vital Signs: Temp: 97.3  F (36.3  C) Temp src: Oral BP: (!) 126/96 Pulse: 55   Resp: 18 SpO2: 100 % O2 Device: None (Room air)    Weight: 146 lbs 8 oz    GEN: Well appearing but in pain, non toxic, cooperative and conversant.   HEENT: The head is normocephalic and atraumatic. Pupils are equal round and reactive to light. Extraocular motions are intact. There is no facial swelling.   CV: Regular rate   PULM: Unlabored breathing   Back: Left-sided CVA tenderness to " palpation  Abdomen left lower quadrant tenderness to palpation and left flank tenderness to palpation.  EXT: Full range of motion.  No edema.  NEURO: Cranial nerves II through XII are intact and symmetric. Bilateral upper and lower extremities grossly show full range of motion without any focal deficits.   PSYCH: Calm and cooperative    Data   Data reviewed today: I reviewed all medications, new labs and imaging results over the last 24 hours.

## 2020-07-19 NOTE — ED NOTES
[facility], [place]   ED Nurse to Floor Handoff     Josefina Walton is a 19 year old female who speaks English and lives with others,  in a home  They arrived in the ED by car from home    ED Chief Complaint: Flank Pain (c/o left flank pain. Onset was a week ago. ) and Dysuria (c/o reported burning pain when urinating. Onset 2 days ago. Noted blood in urine this morning.)    ED Dx;   Final diagnoses:   Ureterolithiasis         Needed?: No    Allergies:   Allergies   Allergen Reactions     Chlorhexidine Rash   .  Past Medical Hx: History reviewed. No pertinent past medical history.   Baseline Mental status: WDL  Current Mental Status changes: at basesline    Infection present or suspected this encounter: yes urinary  Sepsis suspected: No  Isolation type: No active isolations     Activity level - Baseline/Home:  Independent  Activity Level - Current:   Independent    Bariatric equipment needed?: No    In the ED these meds were given:   Medications   0.9% sodium chloride BOLUS (0 mLs Intravenous Stopped 7/19/20 1010)     Followed by   sodium chloride 0.9% infusion ( Intravenous New Bag 7/19/20 0917)   ondansetron (ZOFRAN) injection 4 mg (4 mg Intravenous Given 7/19/20 0746)   HYDROmorphone (PF) (DILAUDID) injection 0.5 mg (0.5 mg Intravenous Given 7/19/20 1301)   tamsulosin (FLOMAX) capsule 0.4 mg (has no administration in time range)   cefTRIAXone (ROCEPHIN) 1 g vial to attach to  mL bag for ADULTS or NS 50 mL bag for PEDS (0 g Intravenous Stopped 7/19/20 1012)   HYDROmorphone (PF) (DILAUDID) injection 0.5 mg (0.5 mg Intravenous Given 7/19/20 0743)   HYDROmorphone (PF) (DILAUDID) injection 0.5 mg (0.5 mg Intravenous Given 7/19/20 0825)       Drips running?  Yes    Home pump  No    Current LDAs  Peripheral IV 07/19/20 Right Upper arm (Active)   Site Assessment WDL 07/19/20 0754   Line Status Infusing 07/19/20 0754   Phlebitis Scale 0-->no symptoms 07/19/20 0754   Infiltration  Scale 0 07/19/20 0754   Infiltration Site Treatment Method  None 07/19/20 0754   Extravasation? No 07/19/20 0754   Dressing Intervention New dressing  07/19/20 0754   Number of days: 0       Labs results:   Labs Ordered and Resulted from Time of ED Arrival Up to the Time of Departure from the ED   ROUTINE UA WITH MICROSCOPIC REFLEX TO CULTURE - Abnormal; Notable for the following components:       Result Value    Blood Urine Small (*)     Protein Albumin Urine 30 (*)     WBC Urine 6 (*)     RBC Urine 26 (*)     Bacteria Urine Moderate (*)     Mucous Urine Present (*)     All other components within normal limits   CBC WITH PLATELETS DIFFERENTIAL - Abnormal; Notable for the following components:    WBC 15.3 (*)     Absolute Neutrophil 12.8 (*)     All other components within normal limits   BASIC METABOLIC PANEL - Abnormal; Notable for the following components:    Creatinine 1.22 (*)     All other components within normal limits   LACTIC ACID WHOLE BLOOD   HCG QUALITATIVE   COVID-19 VIRUS (CORONAVIRUS) BY PCR   SARS-COV-2 (COVID-19) VIRUS RT-PCR   URINE CULTURE AEROBIC BACTERIAL       Imaging Studies:   Recent Results (from the past 24 hour(s))   Abd/pelvis CT no contrast - Stone Protocol    Narrative    CT ABDOMEN PELVIS W/O CONTRAST 7/19/2020 8:49 AM    CLINICAL HISTORY: Flank pain, stone disease suspected  TECHNIQUE: CT scan of the abdomen and pelvis was performed without IV  contrast. Multiplanar reformats were obtained. Dose reduction  techniques were used.  CONTRAST: None.    COMPARISON: None.    FINDINGS:   LOWER CHEST: Normal.    HEPATOBILIARY: Normal.    PANCREAS: Normal.    SPLEEN: Normal size.    ADRENAL GLANDS: Normal.    KIDNEYS/BLADDER: Mild left renal enlargement and perinephric edema.  Mild left hydronephrosis. There is a 2 mm distal left ureteral  calculus, just above the ureterovesical junction. There are a couple  of tiny nonobstructing right calyceal tip stones. No right  hydronephrosis. No left  "intrarenal calculi. The bladder is  decompressed.    BOWEL: Normal with no obstruction or acute inflammatory change.  Nothing for appendicitis.    LYMPH NODES: Normal.    VASCULATURE: Unremarkable.    PELVIC ORGANS: Normal.    OTHER: Trace free fluid in the pelvis is likely physiologic.    MUSCULOSKELETAL: Normal.      Impression    IMPRESSION:   1.  2 mm distal left ureteral calculus, just above the ureterovesical  junction. Mild left hydronephrosis and left perinephric edema.  2.  Tiny nonobstructing right intrarenal calculi.    COLUMBA ASHTON MD       Recent vital signs:   BP (!) 126/96   Pulse 55   Temp 97.3  F (36.3  C) (Oral)   Resp 18   Ht 1.651 m (5' 5\")   Wt 66.5 kg (146 lb 8 oz)   LMP  (LMP Unknown)   SpO2 100%   BMI 24.38 kg/m              Cardiac Rhythm: Regular rhythm  Pt needs tele? No  Skin/wound Issues: None    Code Status: Full Code    Pain control: good    Nausea control: good    Abnormal labs/tests/findings requiring intervention: WBC 15; Creat 1.22; CT    Family present during ED course? Yes   Family Comments/Social Situation comments: PT calm and cooperative.    Tasks needing completion: None    SCOTT SOTOMAYOR, RN  [phone] [place] ED  [phone] [facility] ED    "

## 2020-07-19 NOTE — LETTER
UNIT 6D OBSERVATION [facility]  [address] [address]  Phone: [phone]  Fax: [phone]    July 20, 2020        Josefina Walton  [address] [facility] [address]          To whom it may concern:    RE: Josefina Walton    Patient was seen and treated today at our hospital and will miss school 7/21  Patient may return to work 7/22.    Please contact me for questions or concerns.      Sincerely,        EUGENE Dugan, NP  Emergency Department

## 2020-07-19 NOTE — CONSULTS
Urology Consult, History and Physical    Name: Josefina Walton    MRN: [id number]   YOB: 2000               Chief Complaint:   Left flank pain    History is obtained from the patient and chart review          History of Present Illness:   Josefina Walton is a 19 year old otherwise healthy female who presented left flank pain. She has had left flank pain for the last week, seen at OSH and thought to have MSK pain. Pain improved but then yesterday returned. Worsened since then so she presented to ED. Found to have a ~2mm distal left ureteral stone with mild left pelviectasis. Notes dysuria, frequency, urgency, but does not feel like past UTI (has had one). Has not had fevers, chills, nausea, vomiting. Pain waxes and wanes, but overall much better with pain meds. WBC 15, Cr 1.22.    Recent Labs   Lab 07/19/20  0735   WBC 15.3*     Recent Labs   Lab 07/19/20  0735   CR 1.22*     Urinalysis  Recent Labs   Lab Test 07/19/20  0736   UBLD Small*   NITRITE Negative   LEUKEST Negative   RBCU 26*   WBCU 6*              Past Medical History:   History reviewed. No pertinent past medical history.         Past Surgical History:   History reviewed. No pertinent surgical history.         Social History:     Social History     Tobacco Use     Smoking status: Never Smoker     Smokeless tobacco: Never Used   Substance Use Topics     Alcohol use: Yes     Comment: couple drinks a week            Family History:   History reviewed. No pertinent family history.  Father has recurrent kidney stones         Allergies:     Allergies   Allergen Reactions     Chlorhexidine Rash            Medications:     Current Facility-Administered Medications   Medication     HYDROmorphone (PF) (DILAUDID) injection 0.5 mg     ondansetron (ZOFRAN) injection 4 mg     sodium chloride 0.9% infusion     tamsulosin (FLOMAX) capsule 0.4 mg     Current Outpatient Medications   Medication Sig     HYDROcodone-acetaminophen (NORCO) 5-325 MG tablet Take 1  tablet by mouth every 6 hours as needed for severe pain             Review of Systems:   10 point ROS negative except as noted above            Physical Exam:   VS:  T: 97.3    HR: 55    BP: 126/96    RR: 18   GEN:  AOx3.  NAD.  Pleasant.  HEENT:  Sclerae anicteric.  Conjunctivae pink.  MMM.  NECK:  Supple.  No LAD.  CV:  RRR  LUNGS: Non-labored breathing.  BACK:  No midline or right CVAT. Mild left CVAT.  ABD:  Soft.  Mild LLQ tenderness.  ND.  No rebound or guarding.  No masses.  EXT:  Warm, well perfused.  No edema.  SKIN:  Warm.  Dry.  No rashes.  NEURO:  CN grossly intact.  ELLIOT..          Data:   All laboratory data reviewed and highlighted above:    All pertinent imaging reviewed:    CT A/P 7/19/2020:  IMPRESSION:   1.  2 mm distal left ureteral calculus, just above the ureterovesical  junction. Mild left hydronephrosis and left perinephric edema.  2.  Tiny nonobstructing right intrarenal calculi.           Impression and Plan:   Impression:   Josefina Walton is a 19 year old female with a 2 mm distal left ureteral stone. No evidence of UTI; leukocytosis likely a robust stress response as patient is afebrile. This does not appear to be an infected stone. Unclear what is causing her mild LINDA. Discussed that we would not recommend intervention at this time as she is likely to pass the stones. Would reconsider if signs of infection or poor pain control, inability to tolerate PO.      Plan:   - Agree with admit to ED obs for hydration and repeat labs in AM  - No indications for stenting at this time  - Agree with analgesics, tamsulosin daily  - Please have patient void into a strainer and hat so we can save stone and send to lab for analysis, should she pass her stone  - Please page urology if she shows signs of infection, becomes hemodynamically unstable, or if pain becomes uncontrollable  - Please make NPO at midnight in the event stent is indicated for the above reasons  - Patient voices interest in stone  prevention - recommend referral for stone clinic with nephrology/urology.       Discussed with staff Dr. Radha Greenwood MD  Urology PGY3    Page Job code [id number] with questions (893 first)

## 2020-07-19 NOTE — LETTER
UNIT 6D OBSERVATION [facility]  [address] [facility] [address]  Phone: [phone]  Fax: [phone]    July 20, 2020        Josefina Walton  [address] [facility] [address]          To whom it may concern:    RE: Josefina Walton    Patient was seen and treated today at our hospital and will miss school 7/21  Patient may return to school on 7/22.    Please contact me for questions or concerns.      Sincerely,        EUGENE Dugan, NP  Emergency Department

## 2020-07-20 VITALS
DIASTOLIC BLOOD PRESSURE: 83 MMHG | HEIGHT: 65 IN | RESPIRATION RATE: 16 BRPM | HEART RATE: 65 BPM | OXYGEN SATURATION: 100 % | SYSTOLIC BLOOD PRESSURE: 121 MMHG | TEMPERATURE: 99 F | BODY MASS INDEX: 24.41 KG/M2 | WEIGHT: 146.5 LBS

## 2020-07-20 LAB
ANION GAP SERPL CALCULATED.3IONS-SCNC: 7 MMOL/L (ref 3–14)
BUN SERPL-MCNC: 10 MG/DL (ref 7–30)
CALCIUM SERPL-MCNC: 8.2 MG/DL (ref 8.5–10.1)
CHLORIDE SERPL-SCNC: 110 MMOL/L (ref 94–109)
CO2 SERPL-SCNC: 22 MMOL/L (ref 20–32)
CREAT SERPL-MCNC: 0.89 MG/DL (ref 0.52–1.04)
ERYTHROCYTE [DISTWIDTH] IN BLOOD BY AUTOMATED COUNT: 12.7 % (ref 10–15)
GFR SERPL CREATININE-BSD FRML MDRD: >90 ML/MIN/{1.73_M2}
GLUCOSE SERPL-MCNC: 81 MG/DL (ref 70–99)
HCT VFR BLD AUTO: 37.8 % (ref 35–47)
HGB BLD-MCNC: 11.8 G/DL (ref 11.7–15.7)
MAGNESIUM SERPL-MCNC: 2 MG/DL (ref 1.6–2.3)
MCH RBC QN AUTO: 28.7 PG (ref 26.5–33)
MCHC RBC AUTO-ENTMCNC: 31.2 G/DL (ref 31.5–36.5)
MCV RBC AUTO: 92 FL (ref 78–100)
PHOSPHATE SERPL-MCNC: 2.6 MG/DL (ref 2.5–4.5)
PLATELET # BLD AUTO: 255 10E9/L (ref 150–450)
POTASSIUM SERPL-SCNC: 3.7 MMOL/L (ref 3.4–5.3)
RBC # BLD AUTO: 4.11 10E12/L (ref 3.8–5.2)
SODIUM SERPL-SCNC: 140 MMOL/L (ref 133–144)
WBC # BLD AUTO: 4.7 10E9/L (ref 4–11)

## 2020-07-20 PROCEDURE — 25000128 H RX IP 250 OP 636: Performed by: NURSE PRACTITIONER

## 2020-07-20 PROCEDURE — 25800030 ZZH RX IP 258 OP 636: Performed by: NURSE PRACTITIONER

## 2020-07-20 PROCEDURE — 25000132 ZZH RX MED GY IP 250 OP 250 PS 637: Performed by: FAMILY MEDICINE

## 2020-07-20 PROCEDURE — 84100 ASSAY OF PHOSPHORUS: CPT | Performed by: NURSE PRACTITIONER

## 2020-07-20 PROCEDURE — 36415 COLL VENOUS BLD VENIPUNCTURE: CPT | Performed by: NURSE PRACTITIONER

## 2020-07-20 PROCEDURE — 96376 TX/PRO/DX INJ SAME DRUG ADON: CPT

## 2020-07-20 PROCEDURE — 25000132 ZZH RX MED GY IP 250 OP 250 PS 637: Performed by: NURSE PRACTITIONER

## 2020-07-20 PROCEDURE — 85027 COMPLETE CBC AUTOMATED: CPT | Performed by: NURSE PRACTITIONER

## 2020-07-20 PROCEDURE — 83735 ASSAY OF MAGNESIUM: CPT | Performed by: NURSE PRACTITIONER

## 2020-07-20 PROCEDURE — 99217 ZZC OBSERVATION CARE DISCHARGE: CPT | Mod: Z6 | Performed by: INTERNAL MEDICINE

## 2020-07-20 PROCEDURE — G0378 HOSPITAL OBSERVATION PER HR: HCPCS

## 2020-07-20 PROCEDURE — 80048 BASIC METABOLIC PNL TOTAL CA: CPT | Performed by: NURSE PRACTITIONER

## 2020-07-20 RX ORDER — OXYCODONE HYDROCHLORIDE 5 MG/1
5 CAPSULE ORAL EVERY 4 HOURS PRN
Qty: 5 CAPSULE | Refills: 0 | Status: SHIPPED | OUTPATIENT
Start: 2020-07-20 | End: 2020-10-06

## 2020-07-20 RX ORDER — IBUPROFEN 400 MG/1
400 TABLET, FILM COATED ORAL EVERY 6 HOURS PRN
COMMUNITY
Start: 2020-07-20 | End: 2020-10-06

## 2020-07-20 RX ORDER — TAMSULOSIN HYDROCHLORIDE 0.4 MG/1
0.4 CAPSULE ORAL DAILY
Qty: 30 CAPSULE | Refills: 0 | Status: SHIPPED | OUTPATIENT
Start: 2020-07-21 | End: 2020-08-20

## 2020-07-20 RX ADMIN — ACETAMINOPHEN 650 MG: 325 TABLET, FILM COATED ORAL at 07:27

## 2020-07-20 RX ADMIN — KETOROLAC TROMETHAMINE 30 MG: 30 INJECTION, SOLUTION INTRAMUSCULAR at 07:27

## 2020-07-20 RX ADMIN — Medication 0.2 MG: at 10:57

## 2020-07-20 RX ADMIN — SODIUM CHLORIDE: 9 INJECTION, SOLUTION INTRAVENOUS at 04:59

## 2020-07-20 RX ADMIN — SODIUM CHLORIDE: 9 INJECTION, SOLUTION INTRAVENOUS at 11:38

## 2020-07-20 RX ADMIN — TAMSULOSIN HYDROCHLORIDE 0.4 MG: 0.4 CAPSULE ORAL at 09:35

## 2020-07-20 ASSESSMENT — ENCOUNTER SYMPTOMS
ACTIVITY IMPAIRMENT: NORMAL
PAIN SEVERITY NOW: MILD
DIETARY ISSUES: ADEQUATE INTAKE
SUBJECTIVE PATIENT PAIN CONTROL: WELL CONTROLLED

## 2020-07-20 NOTE — DISCHARGE SUMMARY
[facility], [place]  Hospitalist Discharge Summary      Date of Admission:  7/19/2020  Date of Discharge:  7/20/2020  Discharging Provider: EUGENE Hagen CNP  Discharge Team: Emergency Department Observation Unit    Discharge Diagnoses   2mm Kidney stone    Follow-ups Needed After Discharge   Follow-up Appointments     Adult [facility] Follow-up and recommended labs and tests      Urology will arrange follow up in stone clinic with renal ultrasound in   2-4 weeks    Urology clinic phone number: [phone]  Appointments on [place] and/or [facility] (with [facility] or [facility]   provider or service). Call [phone] if you haven't heard regarding   these appointments within 7 days of discharge.         {Additional follow-up instructions/to-do's for PCP    : Hospital follow up    Unresulted Labs Ordered in the Past 30 Days of this Admission     Date and Time Order Name Status Description    7/19/2020 0723 Urine Culture Aerobic Bacterial Preliminary       These results will be followed up by PCP    Discharge Disposition   Discharged to home  Condition at discharge: Stable      Hospital Course   Josefina Walton is a 19 year old female admitted on 7/19/2020. She has no past medical history. She presents to the ED with  left low back, flank tenderness, hematuria and increasing pain.        1. 2 mm distal left ureteral stone: Patient presents to the ED with severe left back, flank and left lower quadrant pain x 1 week. The pain started suddenly one week ago and woke her out of sleep.Her pain persisted and she went to an outside ED. It was thought to be musculoskeletal pain and she was prescribed muscle relaxers.  Yesterday,  her pain suddenly returned and was very severe and progressed and now she is here for further evaluation today.  This morning also experienced hematuria as well as dysuria which she did not previously have. She denies any fevers, chills, shortness of breath. vomiting or  diarrhea. She is nauseated. She took Vicodin prior to presentation to help with pain with minimal response  Imaging: Abdominal/Pelvis CT : 2 mm distal left ureteral calculus, just above the ureterovesical junction. Mild left hydronephrosis and left perinephric edema.  Tiny nonobstructing right intrarenal calculi. Consults: Urology consulted in the ED. Leukocytosis likely a robust stress response as patient is afebrile. No evidence of UTI; leukocytosis likely a robust stress response as patient is afebrile. This does not appear to be an infected stone. Unclear what is causing her mild LINDA. Not recommending intervention at this time as she is likely to pass the stones. Would reconsider if signs of infection or poor pain control, inability to tolerate PO.Plan: Admit to ED observation for symptom management.(WBC WNL now Creatinine stable. Patient discharged with Oxycodone, alternate Ibuprofen and Acetaminophen, tamsulosin, straining urineurology will arrange follow up in stone clinic with renal ultrasound in 2-4 weeks    Consultations This Hospital Stay   Urology     Code Status   Full Code    Time Spent on this Encounter   I, EUGENE Hagen CNP, personally saw the patient today and spent less than or equal to 30 minutes discharging this patient.       EUGENE Hagen CNP  [facility], [place]  ______________________________________________________________________    Physical Exam   Vital Signs: Temp: 99  F (37.2  C) Temp src: Oral BP: 121/83 Pulse: 65   Resp: 16 SpO2: 100 % O2 Device: None (Room air)    Weight: 146 lbs 8 oz  Constitutional: healthy, alert and no distress   Head: Normocephalic. No masses, lesions, tenderness or abnormalities   Neck: Neck supple. No adenopathy. Thyroid symmetric, normal size,, Carotids without bruits.   ENT: ENT exam normal, no neck nodes or sinus tenderness   Cardiovascular: RRR. No murmurs, clicks gallops or rub   Respiratory: . Good  "diaphragmatic excursion. Lungs clear   Gastrointestinal: Abdomen soft, Flank pain improved. BS normal. No masses, organomegaly.   : Deferred   Musculoskeletal: extremities normal- no gross deformities noted, gait normal and normal muscle tone   Skin: no suspicious lesions or rashes   Neurologic: Gait normal. Reflexes normal and symmetric. Sensation grossly WNL.   Psychiatric: mentation appears normal and affect normal/bright   Hematologic/Lymphatic/Immunologic: normal ant/post cervical, axillary, supraclavicular and inguinal        Primary Care Physician   [facility]    Discharge Orders      US Renal Complete     Discharge Instructions    Please use a strainer every time you urinate. If your stone passes, please call clinic number below.    If stone does not pass, we will arrange a follow up appointment for you in 2-4 weeks. You will have a renal ultrasound prior to this appointment. If you do not hear from urology clinic in 2 days, please call number below.    Continue taking tamsulosin, pain control as needed, and staying well hydrated.    Phone numbers:   - Monday through Friday 8am to 4:30pm: Call [phone] with questions or to schedule or confirm appointment.    - Nights or weekends: call the after hours emergency pager - [phone] and tell the  \"I would like to page the Urology Resident on call.\"  - For emergencies, call [phone]     Reason for your hospital stay    Kidney stone     Adult [facility] Follow-up and recommended labs and tests    Urology will arrange follow up in stone clinic with renal ultrasound in 2-4 weeks    Urology clinic phone number: [phone]  Appointments on [place] and/or [facility] (with [facility] or [facility] provider or service). Call [phone] if you haven't heard regarding these appointments within 7 days of discharge.     Activity    Your activity upon discharge: activity as tolerated     Discharge Instructions    DIET: -Regular DISCHARGE ACTIVITY -Do " not drive until you can press the brake pedal quickly and fully without pain. - Do not operate a motor vehicle while taking narcotic pain medications. MEDICATIONS - Do not take any additional Tylenol (acetaminophen) while using Percocet or Vicodin. - Do not take more than 4,000mg of Tylenol (acetaminophen) in any 24 hour period, as this can cause liver damage. - Take stool softeners such as Senna while you are using narcotics, but stop if you develop diarrhea. - Wean yourself off of narcotic pain medications. - Flomax can help facilitate stone passage, be aware that this will turn your urine orange.  Call or return sooner than your regularly scheduled visit if you develop any of the following: fever, uncontrolled pain, uncontrolled nausea or vomiting. Strain your urine.  You may call the Urology Clinic during daytime hours at [phone]. If after hours, call [phone] and ask to speak with the Urology resident on call.     Strain urine     Diet    Follow this diet upon discharge: Regular       Significant Results and Procedures   Results for orders placed or performed during the hospital encounter of 07/19/20   Abd/pelvis CT no contrast - Stone Protocol    Narrative    CT ABDOMEN PELVIS W/O CONTRAST 7/19/2020 8:49 AM    CLINICAL HISTORY: Flank pain, stone disease suspected  TECHNIQUE: CT scan of the abdomen and pelvis was performed without IV  contrast. Multiplanar reformats were obtained. Dose reduction  techniques were used.  CONTRAST: None.    COMPARISON: None.    FINDINGS:   LOWER CHEST: Normal.    HEPATOBILIARY: Normal.    PANCREAS: Normal.    SPLEEN: Normal size.    ADRENAL GLANDS: Normal.    KIDNEYS/BLADDER: Mild left renal enlargement and perinephric edema.  Mild left hydronephrosis. There is a 2 mm distal left ureteral  calculus, just above the ureterovesical junction. There are a couple  of tiny nonobstructing right calyceal tip stones. No right  hydronephrosis. No left intrarenal calculi. The bladder  is  decompressed.    BOWEL: Normal with no obstruction or acute inflammatory change.  Nothing for appendicitis.    LYMPH NODES: Normal.    VASCULATURE: Unremarkable.    PELVIC ORGANS: Normal.    OTHER: Trace free fluid in the pelvis is likely physiologic.    MUSCULOSKELETAL: Normal.      Impression    IMPRESSION:   1.  2 mm distal left ureteral calculus, just above the ureterovesical  junction. Mild left hydronephrosis and left perinephric edema.  2.  Tiny nonobstructing right intrarenal calculi.    COLUMBA ASHTON MD       Discharge Medications   Current Discharge Medication List      START taking these medications    Details   ibuprofen (ADVIL/MOTRIN) 400 MG tablet Take 1 tablet (400 mg) by mouth every 6 hours as needed for moderate pain  Qty:      Associated Diagnoses: Ureterolithiasis      tamsulosin (FLOMAX) 0.4 MG capsule Take 1 capsule (0.4 mg) by mouth daily  Qty: 30 capsule, Refills: 0    Associated Diagnoses: Ureterolithiasis         CONTINUE these medications which have NOT CHANGED    Details   HYDROcodone-acetaminophen (NORCO) 5-325 MG tablet Take 1 tablet by mouth every 6 hours as needed for severe pain           Allergies   Allergies   Allergen Reactions     Chlorhexidine Rash

## 2020-07-20 NOTE — PLAN OF CARE
Outpatient/Observation goals to be met before discharge home:     - Diagnostic tests and consults completed and resulted if ordered: met   - Vital signs normal or at patient baseline: met  - Tolerating oral intake to maintain hydration: met, remains on IVF to flush stone   - Adequate pain control on oral analgesia: partially met, pain is decreasing, still present but manageable  - Tolerating oral antibiotics or has plans for home infusion setup: NA   - Infection is improving: NA  - Safe disposition plan has been identified: met

## 2020-07-20 NOTE — PROGRESS NOTES
"Urology Daily Progress Note    24 hour events/Subjective:    - NAEON  - Currently without pain  - Has been straining urine, has not seen stone pass yet    O:  Vitals: /69   Pulse 84   Temp 98.5  F (36.9  C) (Oral)   Resp 16   Ht 1.651 m (5' 5\")   Wt 66.5 kg (146 lb 8 oz)   LMP  (LMP Unknown)   SpO2 99%   BMI 24.38 kg/m      General: Alert, interactive, in NAD  Resp: Non-labored breathing on RA  Abdomen: Soft, non-tender, non-distended.   No CVAT  Ext: Warm and well perfused     I/O  UOP  520/NR    Labs  Pending this AM  Yesterday's labs noted for Cr 1.22  UCx 7/19 pending    Heme:  Recent Labs   Lab 07/19/20  0735   WBC 15.3*   HGB 13.0        Chem:  Recent Labs   Lab 07/19/20  0735      POTASSIUM 4.1   CR 1.22*       Assessment/Plan  19 year old y/o female HD#0  s/p admission to ED obs with 2 mm distal left ureteral stone.    - follow up AM labs (WBC WNL now)  - continue analgesia, tamsulosin, straining urine  - ok for discharge  - urology will arrange follow up in stone clinic with renal ultrasound in 2-4 weeks, unless patient passes/collects stone    To be discussed with Dr. Garcia    --    Nova Beth MD  PGY2 Urology    "

## 2020-07-20 NOTE — PLAN OF CARE
Adult Observation     1. - Diagnostic tests and consults completed and resulted if ordered - Yes  2. - Vital signs normal or at patient baseline - Yes, VSS  3. - Tolerating oral intake to maintain hydration - Yes, tolerating regular diet.  4. - Adequate pain control on oral analgesia - Yes, oral pain med for home.  -5.  Tolerating oral antibiotics or has plans for home infusion setup -N/A   - Infection is improving   - Safe disposition plan has been identified - Yes, return to home    Seen by team, can go home, given supplies to continue straining urine. Discussed discharge instruction with patient and mom, questions answered. Assisted to w/c for transfer to exit with mom.

## 2020-07-20 NOTE — PROGRESS NOTES
"Josefina Walton is a 19 year old female patient.  1. Ureterolithiasis      History reviewed. No pertinent past medical history.  No current outpatient medications on file.     Allergies   Allergen Reactions     Chlorhexidine Rash     Active Problems:    Pyelonephritis    Blood pressure 122/86, pulse 70, temperature 98.8  F (37.1  C), temperature source Oral, resp. rate 16, height 1.651 m (5' 5\"), weight 66.5 kg (146 lb 8 oz), SpO2 99 %.    Subjective:  Symptoms:  Improved.  (Left flank pain).    Diet:  Adequate intake.    Activity level: Normal.    Pain:  She complains of pain that is mild.  Pain is well controlled.      Objective:  General Appearance:  Comfortable.    Vital signs: (most recent): Blood pressure 122/86, pulse 70, temperature 98.8  F (37.1  C), temperature source Oral, resp. rate 16, height 1.651 m (5' 5\"), weight 66.5 kg (146 lb 8 oz), SpO2 99 %.  Vital signs are normal.    Output: Producing urine.    HEENT: Normal HEENT exam.    Lungs:  Normal effort and normal respiratory rate.  Breath sounds clear to auscultation.    Heart: Normal rate.  Regular rhythm.  S1 normal and S2 normal.    Abdomen: Abdomen is soft.  Bowel sounds are normal.   There is no abdominal tenderness.     Extremities: Normal range of motion.    Pulses: Distal pulses are intact.    Neurological: Patient is alert.    Pupils:  Pupils are equal, round, and reactive to light.    Skin:  Warm.      Assessment:    Condition: In stable condition.  Improving.   (19 yof presents with left flank pain, CT pos for 2 mm stone.    Urology input appreciated.).       Bobby Johnson MD, MD  7/20/2020    The pt was seen and examine by myself. The case was reviewed and the plan was discussed with the ADELSO.  "

## 2020-07-20 NOTE — PLAN OF CARE
"Outpatient/Observation goals to be met before discharge home:    - Diagnostic tests and consults completed and resulted if ordered: met   - Vital signs normal or at patient baseline: met  - Tolerating oral intake to maintain hydration: met, remains on IVF to flush stone   - Adequate pain control on oral analgesia: partially met, pain is decreasing, still present but manageable  - Tolerating oral antibiotics or has plans for home infusion setup: NA   - Infection is improving: NA  - Safe disposition plan has been identified: met    BP (!) 134/92 (BP Location: Left arm)   Pulse 78   Temp 98.9  F (37.2  C) (Oral)   Resp 18   Ht 1.651 m (5' 5\")   Wt 66.5 kg (146 lb 8 oz)   LMP  (LMP Unknown)   SpO2 95%   BMI 24.38 kg/m      "

## 2020-07-20 NOTE — PLAN OF CARE
Adult Observation    1. - Diagnostic tests and consults completed and resulted if ordered - No, in progress   2. - Vital signs normal or at patient baseline - Yes, VSS  3. - Tolerating oral intake to maintain hydration - No, NPO  4. - Adequate pain control on oral analgesia -No, po med not ordered yet  -5.  Tolerating oral antibiotics or has plans for home infusion setup -N/A   - Infection is improving   - Safe disposition plan has been identified - Yes, return to home

## 2020-07-21 LAB
BACTERIA SPEC CULT: ABNORMAL
BACTERIA SPEC CULT: ABNORMAL
SPECIMEN SOURCE: ABNORMAL

## 2020-07-23 DIAGNOSIS — N12 PYELONEPHRITIS: Primary | ICD-10-CM

## 2020-07-23 DIAGNOSIS — N20.0 KIDNEY STONE: ICD-10-CM

## 2020-07-24 NOTE — TELEPHONE ENCOUNTER
MEDICAL RECORDS REQUEST   [facility]  Urology Clinic  [address] [place] [address]  PHONE: [phone]  Fax: [phone]        FUTURE VISIT INFORMATION                                                   Josefina Walton : 2000 scheduled for future visit at [facility]    APPOINTMENT INFORMATION:    Date: 20 8AM    Provider:  Mimi Rodriguez RN    Reason for Visit/Diagnosis: Kidney stones     REFERRAL INFORMATION:    Referring provider: N/A    Specialty: N/A    Referring providers clinic:  ED     Clinic contact number:  N/A    RECORDS REQUESTED FOR VISIT                                                     NOTES  STATUS/DETAILS   OFFICE NOTE from referring provider  yes   OFFICE NOTE from other specialist  no   DISCHARGE SUMMARY from hospital  yes   DISCHARGE REPORT from the ER  yes   OPERATIVE REPORT  no   MEDICATION LIST  yes   KIDNEY STONE     CT STONE  yes     PRE-VISIT CHECKLIST      Record collection complete Yes- Internal ED notes  CT 20 - IN [facility] PACS    Appointment appropriately scheduled           (right time/right provider) Yes   MyChart activation If no, please explain: In process   Questionnaire complete If no, please explain: In process      Completed by: Vannessa Bess

## 2020-07-27 ENCOUNTER — TELEPHONE (OUTPATIENT)
Dept: UROLOGY | Facility: CLINIC | Age: 20
End: 2020-07-27

## 2020-07-27 NOTE — TELEPHONE ENCOUNTER
----- Message from Alanna Carrillo RN sent at 7/23/2020  1:46 PM CDT -----  Can you please help this patient get set up with Dr. Barrera with a renal US in 2-4 weeks? DX:kidney stone.  I virtual visit is fine. You are going to start to see more of these come through to him because of Dr. Gan leaving us. Thank you!  ----- Message -----  From: Alonso Barrera MD  Sent: 7/23/2020   1:34 PM CDT  To: Alanna Carrillo RN    Renal ultrasound and virtual is OK.  ----- Message -----  From: Alanna Carrillo RN  Sent: 7/23/2020   8:13 AM CDT  To: Alonso Barrera MD    Thanks DANNY. Do you want any imaging prior?  Also, do want a virtual visit or physically in clinic?  ----- Message -----  From: Alonso Barrera MD  Sent: 7/22/2020   4:50 PM CDT  To: Alanna Carrillo RN    I can see her in clinic, that is just fine  DANNY  ----- Message -----  From: Alanna Carrillo RN  Sent: 7/21/2020   8:46 AM CDT  To: Alonso Barrera MD    Hi Eliazar,    Is this one you'd be willing to take?    Please let me know.    Thanks!  Alanna  ----- Message -----  From: Nova Beth MD  Sent: 7/20/2020   8:26 AM CDT  To: Alanna Carrillo RN    Hi Alanna,    Hope you had a nice weekend.    This is a young, healthy pt who was found to have a 2 mm distal stone. She is being discharged today with medical expulsive therapy.    Per Dr. Garcia, can you please arrange follow up in stone clinic in 2-4 weeks with renal ultrasound prior? Will advise patient to strain her urine and call the clinic if she passes stone so she can bring it in for analysis and avoid the visit.    Thanks!  Amelia  [phone]

## 2020-07-27 NOTE — TELEPHONE ENCOUNTER
Left message for pt to call and reschedule appt on 8/11/20 with Michael to 8/7 in a return spot with Dr. Barrera, for ureteral stone.

## 2020-08-10 ENCOUNTER — PRE VISIT (OUTPATIENT)
Dept: UROLOGY | Facility: CLINIC | Age: 20
End: 2020-08-10

## 2020-08-10 NOTE — TELEPHONE ENCOUNTER
Reason for Visit: Consult    Diagnosis: kidney stone    Orders/Procedures/Records: in system    Contact Patient: n/a    Rooming Requirements: normal      Shilpi Fernandez LPN  08/10/20  11:01 AM

## 2020-08-11 ENCOUNTER — PRE VISIT (OUTPATIENT)
Dept: UROLOGY | Facility: CLINIC | Age: 20
End: 2020-08-11

## 2020-08-13 ENCOUNTER — ANCILLARY PROCEDURE (OUTPATIENT)
Dept: ULTRASOUND IMAGING | Facility: CLINIC | Age: 20
End: 2020-08-13
Attending: UROLOGY
Payer: COMMERCIAL

## 2020-08-13 ENCOUNTER — OFFICE VISIT (OUTPATIENT)
Dept: UROLOGY | Facility: CLINIC | Age: 20
End: 2020-08-13
Payer: COMMERCIAL

## 2020-08-13 DIAGNOSIS — N12 PYELONEPHRITIS: ICD-10-CM

## 2020-08-13 DIAGNOSIS — N20.0 KIDNEY STONE: ICD-10-CM

## 2020-08-13 DIAGNOSIS — N20.0 KIDNEY STONE: Primary | ICD-10-CM

## 2020-08-13 NOTE — PROGRESS NOTES
"Urology Consult    Name: Josefina Walton    MRN: [id number]   YOB: 2000               Chief Complaint:   \"kidney stone\"    History is obtained from the patient and chart review          History of Present Illness:   Josefina Walton is a 19 year old female with no prior past medical history presenting with left flank pain 7/19/20, CT scan indicated 2 mm stone in distal left ureter. Patient was discharged and told to follow up in 4 weeks with renal ultrasound.    Today patient reports being completely asymptomatic. Uncertain whether or not she has passed the stone but having no further pain. Denies any hematuria, dysuria.          Past Medical History:   None         Past Surgical History:   None         Social History:     Never smoker.  2 alcoholic drinks/week.         Family History:     Father with history of kidney stones.         Allergies:     Allergies   Allergen Reactions     Chlorhexidine Rash            Medications:     Current Outpatient Medications   Medication Sig     ibuprofen (ADVIL/MOTRIN) 400 MG tablet Take 1 tablet (400 mg) by mouth every 6 hours as needed for moderate pain     tamsulosin (FLOMAX) 0.4 MG capsule Take 1 capsule (0.4 mg) by mouth daily     HYDROcodone-acetaminophen (NORCO) 5-325 MG tablet Take 1 tablet by mouth every 6 hours as needed for severe pain     oxyCODONE (OXY-IR) 5 MG capsule Take 1 capsule (5 mg) by mouth every 4 hours as needed for severe pain (Patient not taking: Reported on 8/13/2020)     No current facility-administered medications for this visit.           Review of Systems:    ROS: 10 point ROS neg other than the symptoms noted above in the HPI           Physical Exam:   VS:  T: Data Unavailable    HR: Data Unavailable    BP: Data Unavailable    RR: Data Unavailable   GEN:  AOx3.  NAD.    CV:  Non cyanotic  LUNGS: Non-labored breathing.   BACK:  No CVA tenderness  ABD:  Non distended  :  deferred  EXT:  Warm, well perfused.    SKIN:  Warm.  Dry.  No " bert.  NEURO:  CN grossly intact.            Data:   All laboratory data reviewed:  All pertinent imaging reviewed:    CT scan of the abdomen:   7/19/20 CT Abd/Pelvis WO contrast  IMPRESSION:   1.  2 mm distal left ureteral calculus, just above the ureterovesical  junction. Mild left hydronephrosis and left perinephric edema.  2.  Tiny nonobstructing right intrarenal calculi.     Renal US 8/13/20  IMPRESSION:  1. No ultrasound evidence of pyelonephritis. No hydronephrosis.  2. Redemonstration of two echogenic, nonobstructing right renal  calculi measuring up to 3 mm.         Impression and Plan:   Impression:   Josefina Walton is a 19 year old female presented with 2 mm stone in L distal ureter with some mild hydronephrosis on 7/19/20. Here for follow up with renal ultrasound prior, hydronephrosis appears to have resolved. Still has 2 small right kidney stones. Counseled patient regarding right kidney stones as well as attempting to collect stones for analysis. Offered CT scan for further imaging but she declined.      Plan:     - 24 hour urine collection.  - Metabolic stone clinic due to bilateral stones present at young age.    - Follow up urology clinic as needed.     Jesus Call MD  PGY-2 Urology    This patient's exam findings, labs, and imaging discussed with urology staff surgeon Dr. Barrera, who developed the treatment plan.    I saw and examined the patient with the resident today.  I agree with the resident note and plan of care as above.     No significant right renal stones based on CT abdomen/pelvis 7/19/20.  I reviewed images and report from this study.  2 punctate calcifications in the right kidney.  Offered pelvic CT to 100% verify passage of left distal ureteral stone.  Pt and mother decline and I also advise observation without CT given asymptomatic at this time and left hydronephrosis resolved on renal ultrasound today.  I reviewed the report and images from his scrotal ultrasound on August 13,  2020.     Alonso Barrera MD  Urology Staff

## 2020-08-13 NOTE — PATIENT INSTRUCTIONS
Follow up with Nephrology with Dr. Goldstein in ~6 weeks (After you completed the LithoLink)    Please complete the LithoLink 24hr Urine collection x1 . They will mail you the kit with instructions on completing the collection and mailing it back to them. If you do not receive the LithoLink in 7-10 days please call [phone]. Once you complete the kit and return it, and we get the results we will contact you to schedule a follow up appointment if one is not already made.    It was a pleasure meeting with you today.  Thank you for allowing me and my team the privilege of caring for you today.  YOU are the reason we are here, and I truly hope we provided you with the excellent service you deserve.  Please let us know if there is anything else we can do for you so that we can be sure you are leaving completely satisfied with your care experience.      Juan Carrillo, EMT

## 2020-08-13 NOTE — LETTER
"8/13/2020       RE: Josefina Walton  [address]  [facility] [address]     Dear Colleague,    Thank you for referring your patient, Josefina Walton, to the [facility] UROLOGY AND INST FOR PROSTATE AND UROLOGIC CANCERS at [facility]. Please see a copy of my visit note below.    Urology Consult    Name: Josefina Walton    MRN: [id number]   YOB: 2000               Chief Complaint:   \"kidney stone\"    History is obtained from the patient and chart review          History of Present Illness:   Josefina Walton is a 19 year old female with no prior past medical history presenting with left flank pain 7/19/20, CT scan indicated 2 mm stone in distal left ureter. Patient was discharged and told to follow up in 4 weeks with renal ultrasound.    Today patient reports being completely asymptomatic. Uncertain whether or not she has passed the stone but having no further pain. Denies any hematuria, dysuria.          Past Medical History:   None         Past Surgical History:   None         Social History:     Never smoker.  2 alcoholic drinks/week.         Family History:     Father with history of kidney stones.         Allergies:     Allergies   Allergen Reactions     Chlorhexidine Rash            Medications:     Current Outpatient Medications   Medication Sig     ibuprofen (ADVIL/MOTRIN) 400 MG tablet Take 1 tablet (400 mg) by mouth every 6 hours as needed for moderate pain     tamsulosin (FLOMAX) 0.4 MG capsule Take 1 capsule (0.4 mg) by mouth daily     HYDROcodone-acetaminophen (NORCO) 5-325 MG tablet Take 1 tablet by mouth every 6 hours as needed for severe pain     oxyCODONE (OXY-IR) 5 MG capsule Take 1 capsule (5 mg) by mouth every 4 hours as needed for severe pain (Patient not taking: Reported on 8/13/2020)     No current facility-administered medications for this visit.           Review of Systems:    ROS: 10 point ROS neg other than the symptoms noted above in the HPI           " Physical Exam:   VS:  T: Data Unavailable    HR: Data Unavailable    BP: Data Unavailable    RR: Data Unavailable   GEN:  AOx3.  NAD.    CV:  Non cyanotic  LUNGS: Non-labored breathing.   BACK:  No CVA tenderness  ABD:  Non distended  :  deferred  EXT:  Warm, well perfused.    SKIN:  Warm.  Dry.  No rashes.  NEURO:  CN grossly intact.            Data:   All laboratory data reviewed:  All pertinent imaging reviewed:    CT scan of the abdomen:   7/19/20 CT Abd/Pelvis WO contrast  IMPRESSION:   1.  2 mm distal left ureteral calculus, just above the ureterovesical  junction. Mild left hydronephrosis and left perinephric edema.  2.  Tiny nonobstructing right intrarenal calculi.     Renal US 8/13/20  IMPRESSION:  1. No ultrasound evidence of pyelonephritis. No hydronephrosis.  2. Redemonstration of two echogenic, nonobstructing right renal  calculi measuring up to 3 mm.         Impression and Plan:   Impression:   Josefina Walton is a 19 year old female presented with 2 mm stone in L distal ureter with some mild hydronephrosis on 7/19/20. Here for follow up with renal ultrasound prior, hydronephrosis appears to have resolved. Still has 2 small right kidney stones. Counseled patient regarding right kidney stones as well as attempting to collect stones for analysis. Offered CT scan for further imaging but she declined.      Plan:     - 24 hour urine collection.  - Metabolic stone clinic due to bilateral stones present at young age.    - Follow up urology clinic as needed.     Jesus Call MD  PGY-2 Urology    This patient's exam findings, labs, and imaging discussed with urology staff surgeon Dr. Barrera, who developed the treatment plan.    I saw and examined the patient with the resident today.  I agree with the resident note and plan of care as above.     No significant right renal stones based on CT abdomen/pelvis 7/19/20.  I reviewed images and report from this study.  2 punctate calcifications in the right  kidney.  Offered pelvic CT to 100% verify passage of left distal ureteral stone.  Pt and mother decline and I also advise observation without CT given asymptomatic at this time and left hydronephrosis resolved on renal ultrasound today.  I reviewed the report and images from his scrotal ultrasound on August 13, 2020.     Alonso Barrera MD  Urology Staff

## 2020-08-13 NOTE — NURSING NOTE
New Stone consult-    First time with stones- Went to the ED with 2 mm distal left ureteral stone.      Chief Complaint   Patient presents with     New Patient     Stone consult        Last menstrual period 08/13/2020. There is no height or weight on file to calculate BMI.    Patient Active Problem List   Diagnosis     Pyelonephritis       Allergies   Allergen Reactions     Chlorhexidine Rash       Current Outpatient Medications   Medication Sig Dispense Refill     ibuprofen (ADVIL/MOTRIN) 400 MG tablet Take 1 tablet (400 mg) by mouth every 6 hours as needed for moderate pain       tamsulosin (FLOMAX) 0.4 MG capsule Take 1 capsule (0.4 mg) by mouth daily 30 capsule 0     HYDROcodone-acetaminophen (NORCO) 5-325 MG tablet Take 1 tablet by mouth every 6 hours as needed for severe pain       oxyCODONE (OXY-IR) 5 MG capsule Take 1 capsule (5 mg) by mouth every 4 hours as needed for severe pain (Patient not taking: Reported on 8/13/2020) 5 capsule 0       Social History     Tobacco Use     Smoking status: Never Smoker     Smokeless tobacco: Never Used   Substance Use Topics     Alcohol use: Yes     Comment: couple drinks a week     Drug use: Never       Juan Carrillo, EMT, EMT  8/13/2020  10:38 AM

## 2020-08-26 ENCOUNTER — TRANSFERRED RECORDS (OUTPATIENT)
Dept: HEALTH INFORMATION MANAGEMENT | Facility: CLINIC | Age: 20
End: 2020-08-26

## 2020-09-21 ENCOUNTER — VIRTUAL VISIT (OUTPATIENT)
Dept: FAMILY MEDICINE | Facility: OTHER | Age: 20
End: 2020-09-21
Payer: COMMERCIAL

## 2020-09-21 PROCEDURE — 99421 OL DIG E/M SVC 5-10 MIN: CPT | Performed by: PHYSICIAN ASSISTANT

## 2020-09-21 NOTE — PROGRESS NOTES
"Date: 2020 14:07:43  Clinician: Paola Spears  Clinician NPI: [id number]  Patient: Josefina Walton  Patient : 2000  Patient Address: [address] [place] [id number]  Patient Phone: [phone]  Visit Protocol: URI  Patient Summary:  Josefina is a 19 year old ( : 2000 ) female who initiated a [facility] Visit for COVID-19 (Coronavirus) evaluation and screening. When asked the question \"Please sign me up to receive news, health information and promotions from [facility].\", Josefina responded \"No\".    Josefina states her symptoms started 1-2 days ago.   Her symptoms consist of diarrhea, nasal congestion, and anosmia. She is experiencing difficulty breathing due to nasal congestion but she is not short of breath.   Symptom details   Nasal secretions: The color of her mucus is clear.   Josefina denies having chills, malaise, facial pain or pressure, fever, sore throat, teeth pain, ageusia, cough, headache, ear pain, vomiting, rhinitis, nausea, wheezing, enlarged lymph nodes, and myalgias. She also denies taking antibiotic medication in the past month and having recent facial or sinus surgery in the past 60 days.    Pertinent COVID-19 (Coronavirus) information  In the past 14 days, Josefina has not worked in a congregate living setting.   She either works or volunteers as a healthcare worker or a , or works or volunteers in a healthcare facility. She provides direct patient care. 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   Josefina also has not lived in a congregate living setting in the past 14 days. She does not live with a healthcare worker.   Josefina has had a close contact with a laboratory-confirmed COVID-19 patient within 14 days of symptom onset. She was not exposed at her work. Additional information about contact with COVID-19 (Coronavirus) patient as reported by the patient (free text): Exposed to friends a " week ago in a group setting, they later tested positive that week   Since December 2019, Josefina and has not had upper respiratory infection or influenza-like illness. Has not been diagnosed with lab-confirmed COVID-19 test   Pertinent medical history  Josefina does not get yeast infections when she takes antibiotics.   Josefina needs a return to work/school note.   Weight: 140 lbs   Josefina does not smoke or use smokeless tobacco.   She denies pregnancy and denies breastfeeding. She has menstruated in the past month.   Weight: 140 lbs    MEDICATIONS: Tri-Lo-Sprintec oral, ALLERGIES: NKDA  Clinician Response:  Dear Josefina,   Your symptoms show that you may have coronavirus (COVID-19). This illness can cause fever, cough and trouble breathing. Many people get a mild case and get better on their own. Some people can get very sick.  What should I do?  We would like to test you for this virus.   1. Please call [phone] to schedule your visit. Explain that you were referred by [facility] to have a COVID-19 test. Be ready to share your [facility] visit ID number.  The following will serve as your written order for this COVID Test, ordered by me, for the indication of suspected COVID [Z20.828]: The test will be ordered in [employer], our electronic health record, after you are scheduled. It will show as ordered and authorized by Hima Mares MD.  Order: COVID-19 (Coronavirus) PCR for SYMPTOMATIC testing from [facility].      2. When it's time for your COVID test:  Stay at least 6 feet away from others. (If someone will drive you to your test, stay in the backseat, as far away from the  as you can.)   Cover your mouth and nose with a mask, tissue or washcloth.  Go straight to the testing site. Don't make any stops on the way there or back.      3.Starting now: Stay home and away from others (self-isolate) until:   You've had no fever---and no medicine that reduces fever---for one full day (24 hours). And...   Your other symptoms have  "gotten better. For example, your cough or breathing has improved. And...   At least 10 days have passed since your symptoms started.       During this time, don't leave the house except for testing or medical care.   Stay in your own room, even for meals. Use your own bathroom if you can.   Stay away from others in your home. No hugging, kissing or shaking hands. No visitors.  Don't go to work, school or anywhere else.    Clean \"high touch\" surfaces often (doorknobs, counters, handles, etc.). Use a household cleaning spray or wipes. You'll find a full list of  on the EPA website: www.epa.gov/pesticide-registration/list-n-disinfectants-use-against-sars-cov-2.   Cover your mouth and nose with a mask, tissue or washcloth to avoid spreading germs.  Wash your hands and face often. Use soap and water.  Caregivers in these groups are at risk for severe illness due to COVID-19:  o People 65 years and older  o People who live in a nursing home or long-term care facility  o People with chronic disease (lung, heart, cancer, diabetes, kidney, liver, immunologic)  o People who have a weakened immune system, including those who:   Are in cancer treatment  Take medicine that weakens the immune system, such as corticosteroids  Had a bone marrow or organ transplant  Have an immune deficiency  Have poorly controlled HIV or AIDS  Are obese (body mass index of 40 or higher)  Smoke regularly   o Caregivers should wear gloves while washing dishes, handling laundry and cleaning bedrooms and bathrooms.  o Use caution when washing and drying laundry: Don't shake dirty laundry, and use the warmest water setting that you can.  o For more tips, go to www.cdc.gov/coronavirus/2019-ncov/downloads/10Things.pdf.    4.Sign up for [employer]. We know it's scary to hear that you might have COVID-19. We want to track your symptoms to make sure you're okay over the next 2 weeks. Please look for an email from [employer]---this is a free, " online program that we'll use to keep in touch. To sign up, follow the link in the email. Learn more at [email or web address]  How can I take care of myself?   Get lots of rest. Drink extra fluids (unless a doctor has told you not to).   Take Tylenol (acetaminophen) for fever or pain. If you have liver or kidney problems, ask your family doctor if it's okay to take Tylenol.   Adults can take either:    650 mg (two 325 mg pills) every 4 to 6 hours, or...   1,000 mg (two 500 mg pills) every 8 hours as needed.    Note: Don't take more than 3,000 mg in one day. Acetaminophen is found in many medicines (both prescribed and over-the-counter medicines). Read all labels to be sure you don't take too much.   For children, check the Tylenol bottle for the right dose. The dose is based on the child's age or weight.    If you have other health problems (like cancer, heart failure, an organ transplant or severe kidney disease): Call your specialty clinic if you don't feel better in the next 2 days.       Know when to call 911. Emergency warning signs include:    Trouble breathing or shortness of breath Pain or pressure in the chest that doesn't go away Feeling confused like you haven't felt before, or not being able to wake up Bluish-colored lips or face.  Where can I get more information?   [facility] -- About COVID-19: [email or web address]   CDC -- What to Do If You're Sick: www.cdc.gov/coronavirus/2019-ncov/about/steps-when-sick.html   CDC -- Ending Home Isolation: www.cdc.gov/coronavirus/2019-ncov/hcp/disposition-in-home-patients.html   CDC -- Caring for Someone: www.cdc.gov/coronavirus/2019-ncov/if-you-are-sick/care-for-someone.html   [facility] -- Interim Guidance for Hospital Discharge to Home: www.health.[facility].[place]/diseases/coronavirus/hcp/hospdischarge.pdf   [facility] clinical trials (COVID-19 research studies): clinicalaffairs.[facility]-clinical-trials    Below are the COVID-19  hotlines at the [place] Department of Health ([facility]). Interpreters are available.    For health questions: Call 448-323-3087 or 1-758.580.6395 (7 a.m. to 7 p.m.) For questions about schools and childcare: Call [phone] or [phone] (7 a.m. to 7 p.m.)    Diagnosis: Diarrhea, unspecified  Diagnosis ICD: R19.7

## 2020-09-22 DIAGNOSIS — Z20.822 SUSPECTED COVID-19 VIRUS INFECTION: Primary | ICD-10-CM

## 2020-09-23 ENCOUNTER — NURSE TRIAGE (OUTPATIENT)
Dept: NURSING | Facility: CLINIC | Age: 20
End: 2020-09-23

## 2020-09-23 LAB
SARS-COV-2 RNA SPEC QL NAA+PROBE: ABNORMAL
SPECIMEN SOURCE: ABNORMAL

## 2020-09-23 NOTE — TELEPHONE ENCOUNTER
"Coronavirus (COVID-19) Notification    Caller Name (Patient, parent, daughter/son, grandparent, etc)  Patient-Josefina    Reason for call  Notify of Positive Coronavirus (COVID-19) lab results, assess symptoms,  review  [facility] [place] recommendations    Lab Result    Lab test:  2019-nCoV rRt-PCR or SARS-CoV-2 PCR    Oropharyngeal AND/OR nasopharyngeal swabs is POSITIVE for 2019-nCoV RNA/SARS-COV-2 PCR (COVID-19 virus)    RN Recommendations/Instructions per [facility] Coronavirus COVID-19 recommendations    Brief introduction script  Introduce self then review script:  \"I am calling on behalf of [employer]  We were notified that your Coronavirus test (COVID-19) for was POSITIVE for the virus.  I have some information to relay to you but first I wanted to mention that the [place] Dept of Health will be contacting you shortly [it's possible MD already called Patient] to talk to you more about how you are feeling and other people you have had contact with who might now also have the virus.  Also, [facility] is Partnering with the [facility] for Covid-19 research, you may be contacted directly by research staff.\"    Assessment (Inquire about Patient's current symptoms)   Assessment   Current Symptoms at time of phone call: (if no symptoms, document No symptoms] Loss of taste ans smell   Symptoms onset (if applicable) 9/21     If at time of call, Patients symptoms hare worsened, the Patient should contact 911 or have someone drive them to Emergency Dept promptly:      If Patient calling 911, inform 911 personal that you have tested positive for the Coronavirus (COVID-19).  Place mask on and await 911 to arrive.    If Emergency Dept, If possible, please have another adult drive you to the Emergency Dept but you need to wear mask when in contact with other people.      Review information with Patient    Your result was positive. This means you have COVID-19 (coronavirus).  We have sent you a letter " that reviews the information that I'll be reviewing with you now.    How can I protect others?    If you have symptoms: stay home and away from others (self-isolate) until:    You've had no fever--and no medicine that reduces fever--for 1 full day (24 hours). And       Your other symptoms have gotten better. For example, your cough or breathing has improved. And     At least 10 days have passed since your symptoms started. (If you've been told by a doctor that you have a weak immune system, wait 20 days.)     If you don't have symptoms: Stay home and away from others (self-isolate) until at least 10 days have passed since your first positive COVID-19 test. (Date test collected)    During this time:    Stay in your own room, including for meals. Use your own bathroom if you can.    Stay away from others in your home. No hugging, kissing or shaking hands. No visitors.     Don't go to work, school or anywhere else.     Clean  high touch  surfaces often (doorknobs, counters, handles, etc.). Use a household cleaning spray or wipes. You'll find a full list on the EPA website at www.epa.gov/pesticide-registration/list-n-disinfectants-use-against-sars-cov-2.     Cover your mouth and nose with a mask, tissue or other face covering to avoid spreading germs.    Wash your hands and face often with soap and water.    Caregivers in these groups are at risk for severe illness due to COVID-19:  o People 65 years and older  o People who live in a nursing home or long-term care facility  o People with chronic disease (lung, heart, cancer, diabetes, kidney, liver, immunologic)  o People who have a weakened immune system, including those who:  - Are in cancer treatment  - Take medicine that weakens the immune system, such as corticosteroids  - Had a bone marrow or organ transplant  - Have an immune deficiency  - Have poorly controlled HIV or AIDS  - Are obese (body mass index of 40 or higher)  - Smoke regularly    Caregivers should  wear gloves while washing dishes, handling laundry and cleaning bedrooms and bathrooms.    Wash and dry laundry with special caution. Don't shake dirty laundry, and use the warmest water setting you can.    If you have a weakened immune system, ask your doctor about other actions you should take.    For more tips, go to www.cdc.gov/coronavirus/2019-ncov/downloads/10Things.pdf.    You should not go back to work until you meet the guidelines above for ending your home isolation. You should meet these along with any other guidelines that your employer has.    Employers: This document serves as formal notice of your employee's medical guidelines for going back to work. They must meet the above guidelines before going back to work in person.    How can I take care of myself?    1. Get lots of rest. Drink extra fluids (unless a doctor has told you not to).    2. Take Tylenol (acetaminophen) for fever or pain. If you have liver or kidney problems, ask your family doctor if it's okay to take Tylenol.     Take either:     650 mg (two 325 mg pills) every 4 to 6 hours, or     1,000 mg (two 500 mg pills) every 8 hours as needed.     Note: Don't take more than 3,000 mg in one day. Acetaminophen is found in many medicines (both prescribed and over-the-counter medicines). Read all labels to be sure you don't take too much.    For children, check the Tylenol bottle for the right dose (based on their age or weight).    3. If you have other health problems (like cancer, heart failure, an organ transplant or severe kidney disease): Call your specialty clinic if you don't feel better in the next 2 days.    4. Know when to call 911: Emergency warning signs include:    Trouble breathing or shortness of breath    Pain or pressure in the chest that doesn't go away    Feeling confused like you haven't felt before, or not being able to wake up    Bluish-colored lips or face    5. Sign up for [employer]. We know it's scary to hear that you  have COVID-19. We want to track your symptoms to make sure you're okay over the next 2 weeks. Please look for an email from [employer]--this is a free, online program that we'll use to keep in touch. To sign up, follow the link in the email. Learn more at [email or web address].    Where can I get more information?    [facility]: [email or web address]    Coronavirus Basics: www.health.[facility].[place]./diseases/coronavirus/basics.html    What to Do If You're Sick: www.cdc.gov/coronavirus/2019-ncov/about/steps-when-sick.html    Ending Home Isolation: www.cdc.gov/coronavirus/2019-ncov/hcp/disposition-in-home-patients.html     Caring for Someone with COVID-19: www.cdc.gov/coronavirus/2019-ncov/if-you-are-sick/care-for-someone.html     [facility] clinical trials (COVID-19 research studies): clinicalaffairs.[facility]-clinical-trials     A Positive COVID-19 letter will be sent via [employer] or the mail. (Exception, no letters sent to Presurgerical/Preprocedure Patients)    [Name]  Amarilys Bonilla RN

## 2020-09-28 ENCOUNTER — PRE VISIT (OUTPATIENT)
Dept: NEPHROLOGY | Facility: CLINIC | Age: 20
End: 2020-09-28

## 2020-09-28 NOTE — TELEPHONE ENCOUNTER
Reason for Visit: Stone Prevention    Diagnosis: recurrent kidney stone    Orders/Procedures/Records: [facility] available    Contact Patient: n/a    Rooming Requirements: Virtual, make sure Care Everywhere is updated      Shilpi Fernandez LPN  09/28/20  10:46 AM

## 2020-10-06 ENCOUNTER — VIRTUAL VISIT (OUTPATIENT)
Dept: NEPHROLOGY | Facility: CLINIC | Age: 20
End: 2020-10-06
Payer: COMMERCIAL

## 2020-10-06 DIAGNOSIS — N20.0 KIDNEY STONE: ICD-10-CM

## 2020-10-06 PROBLEM — N12 PYELONEPHRITIS: Status: RESOLVED | Noted: 2020-07-19 | Resolved: 2020-10-06

## 2020-10-06 PROCEDURE — 99204 OFFICE O/P NEW MOD 45 MIN: CPT | Mod: 95 | Performed by: INTERNAL MEDICINE

## 2020-10-06 RX ORDER — NORGESTIMATE AND ETHINYL ESTRADIOL
KIT
COMMUNITY
Start: 2020-07-31

## 2020-10-06 NOTE — PATIENT INSTRUCTIONS
Dear Josefina Vidal were seen in the [facility].  Basic advice to avoid kidney stones  - Drink 100 ounces of fluid daily (urine volume should be 80 ounces per day)  - low sodium diet (less than 2400 mg sodium per day- 500-700 mg per meal)  - minimize processed foods  - three servings daily of food/beverage high in calcium.  Please have one high calcium food three times a day with meals - can be either 8 oz milk, 6 oz yogurt or 2 oz low sodium cheese or 8 oz calcium fortified OJ/dairy alternative)   - unsweetened flax milk is the preferred dairy alternative  ---- Total should be at least 1000 mg calcium a day from food  - Protein (meat) in moderation  - add lemon or lime to foods and beverages.  Consider 2-4 oz lemon or lime juice diluted in water.  I advise against lemonade since it is high in sugar and low in actual lemon juice.    Higher fruit and vegetable intake preferred  Some good options include: (higher alkali fruits and vegetables)  -apples, apricots, oranges, peaches, pears, raisins, strawberries, carrots, cauliflower, eggplant, lettuce, potatoes, tomatoes, and zucchini     [email or web address]    Today we discussed  - work on high fluid intake - around 100 ounces per day  - work on decreasing salt in diet (ie cereal, breads, bagels, frozen meals)   - continue with consistent dietary calcium  - start incorporating more fruits and vegetables    Repeat 24 hour urine in approximately 4 months    Please set up appointment with:  Daniela Hill MD in 5-6 months    It was a pleasure meeting with you today. Thank you for allowing me and my team the privilege of caring for you today. Please let us know if there is anything else we can do for you.    Take care!  Daniela Hill MD  Department of Medicine  Division of Renal Diseases and Hypertension  [facility]    Email: [email or web address]  You may reach a nurse by calling the  urology clinic at [phone]

## 2020-10-06 NOTE — PROGRESS NOTES
[facility]rology [facility]  10/06/2020     Josefina Walton MRN:[id number] YOB: 2000  Primary care provider: Clinic, [facility]  Requesting physician: Alonso Barrera     ASSESSMENT AND RECOMMENDATIONS:   Josefina Walton is a 19 year old female presenting for nephrolithiasis.  # Recurrent kidney stones: stone type unknown  Discussed low salt diet, high fluid intake, increase fruits and vegetables (only likes carrots and corn).  Also discussed to continue with consistent dietary calcium    Repeat 24 hour chemistries 4 months  Repeat imaging one year  RTC 5-6 months    Start 2:05 pm  End 2:41 PM     Daniela Hill MD   [facility]  Department of Medicine  Division of Renal Disease and Hypertension  [phone]       REASON FOR CONSULT: nephrolithiasis    HISTORY OF PRESENT ILLNESS:  Josefina Walton is a 19 year old with first episode of kidney stone July 2020.  Presented to the ER with flank pain and had a lot of pain but did not notice stone pass.  Prior to this ER visit, she had gone to a different ER ([facility]) and was diagnosed with musculoskeletal back pain and given pain medications.  Pain got worse so she went to [facility] ER.    7/2020 flank pain evaluated in ER showed   CT 7/19/2020  1.  2 mm distal left ureteral calculus, just above the ureterovesical  junction. Mild left hydronephrosis and left perinephric edema.  2.  Tiny nonobstructing right intrarenal calculi.    Last imaging: ultrasound 8/13/2020 - no hydro  Last Surgery: none for stones    Josefina Walton's diet consists of 2-3 meals per day.    1st meal sometimes skips - usually cereal, bagel or toast - likes life (with sugar) and frosted flakes  Usually drinks just water.  Never drinks a glass of milk.  Occasional coffee (not daily)  2nd meal sometimes snacks with cheese, crackers or packs a sandwich (turkey/cheese/acevedo) or left overs.  chipotle on occasion.  If she is at home, spinach  salad with cheese, sunflower seeds and balsamic vinaigrette   3rd meal - makes dinner with roommates - chicken, pasta, frozen lasagna, goes out to eat twice per week  Snacks fruit snacks, applesauce, sometimes a little candy, potato chips, tortilla chips and salsa  Does not eat a lot of vegetables - does not like the texture.  Usually dinner does not include vegetables    Fluid intake includes mainly water, rare coffee, occasional soda (sugar sweetened citrus flavor)    Sodium intake is no monitored but also does not add sodium    Calcium intake is some milk with cereal and cheese with lunch and dinner.    Family history is positive for kidney stones in father and paternal grandfather.  Her younger sister has not had kidney stones.    I reviewed 24 hour urine chemstries:          PAST MEDICAL HISTORY:  Reviewed  Kidney stone  No past medical history on file.    PSH:  Reviewed  Dental extraction     MEDICATIONS:  Current Outpatient Medications   Medication Sig Dispense Refill     HYDROcodone-acetaminophen (NORCO) 5-325 MG tablet Take 1 tablet by mouth every 6 hours as needed for severe pain       ibuprofen (ADVIL/MOTRIN) 400 MG tablet Take 1 tablet (400 mg) by mouth every 6 hours as needed for moderate pain       oxyCODONE (OXY-IR) 5 MG capsule Take 1 capsule (5 mg) by mouth every 4 hours as needed for severe pain (Patient not taking: Reported on 8/13/2020) 5 capsule 0      Oral contraceptive    ALLERGIES:    Allergies   Allergen Reactions     Chlorhexidine Rash       REVIEW OF SYSTEMS:  A 10 point review of systems was negative except as noted above.    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  Social History     Socioeconomic History     Marital status: Single     Spouse name: Not on file     Number of children: Not on file     Years of education: Not on file     Highest education level: Not on file   Occupational History     Not on file   Social Needs      Financial resource strain: Not on file     Food insecurity     Worry: Not on file     Inability: Not on file     Transportation needs     Medical: Not on file     Non-medical: Not on file   Tobacco Use     Smoking status: Never Smoker     Smokeless tobacco: Never Used   Substance and Sexual Activity     Alcohol use: Yes     Comment: couple drinks a week     Drug use: Never     Sexual activity: Not on file   Lifestyle     Physical activity     Days per week: Not on file     Minutes per session: Not on file     Stress: Not on file   Relationships     Social connections     Talks on phone: Not on file     Gets together: Not on file     Attends Ep[employer]opalian service: Not on file     Active member of club or organization: Not on file     Attends meetings of clubs or organizations: Not on file     Relationship status: Not on file     Intimate partner violence     Fear of current or ex partner: Not on file     Emotionally abused: Not on file     Physically abused: Not on file     Forced sexual activity: Not on file   Other Topics Concern     Not on file   Social History Narrative     Not on file   occasional alcohol on weekends    FAMILY MEDICAL HISTORY:   + kidney stones    PHYSICAL EXAM:   There were no vitals taken for this visit.     GENERAL APPEARANCE: alert and no distress  RESP: normal work of breathing  NEURO: face symmetric, mentation intact and speech normal  Psych: well groomed, affect full, pleasant, normal mental status    LABS:   I reviewed:  Electrolytes/Renal -   Recent Labs   Lab Test 07/20/20  0625 07/19/20  0735    140   POTASSIUM 3.7 4.1   CHLORIDE 110* 106   CO2 22 26   BUN 10 16   CR 0.89 1.22*   GLC 81 97   CHERELLE 8.2* 8.5   MAG 2.0  --    PHOS 2.6  --        CBC -   Recent Labs   Lab Test 07/20/20  0625 07/19/20  0735   WBC 4.7 15.3*   HGB 11.8 13.0    343       LFTs - No lab results found.    Coags - No lab results found.    Iron Panel - No lab results found.    Endocrine - No lab results  found.    IMAGIN2020 CT scan  KIDNEYS/BLADDER: Mild left renal enlargement and perinephric edema.  Mild left hydronephrosis. There is a 2 mm distal left ureteral  calculus, just above the ureterovesical junction. There are a couple  of tiny nonobstructing right calyceal tip stones. No right  hydronephrosis. No left intrarenal calculi. The bladder is  decompressed.    Daniela Hill MD

## 2020-10-06 NOTE — LETTER
10/6/2020       RE: Josefina Walton  [address]  [place] [address]     Dear Colleague,    Thank you for referring your patient, Josefina Walton, to the [facility]PHROLOG[facility] [place] [facility]. Please see a copy of my visit note below.    [facility] Nephrology Comprehensive Stone Clinic  10/06/2020     Josefina Walton MRN:[id number] YOB: 2000  Primary care provider: Clinic, [facility]  Requesting physician: Alonso Barrera     ASSESSMENT AND RECOMMENDATIONS:   Josefina Walton is a 19 year old female presenting for nephrolithiasis.  # Recurrent kidney stones: stone type unknown  Discussed low salt diet, high fluid intake, increase fruits and vegetables (only likes carrots and corn).  Also discussed to continue with consistent dietary calcium    Repeat 24 hour chemistries 4 months  Repeat imaging one year  RTC 5-6 months    Start 2:05 pm  End 2:41 PM     Daniela Hill MD   [facility]  Department of Medicine  Division of Renal Disease and Hypertension  [phone]       REASON FOR CONSULT: nephrolithiasis    HISTORY OF PRESENT ILLNESS:  Josefina Walton is a 19 year old with first episode of kidney stone July 2020.  Presented to the ER with flank pain and had a lot of pain but did not notice stone pass.  Prior to this ER visit, she had gone to a different ER ([facility]) and was diagnosed with musculoskeletal back pain and given pain medications.  Pain got worse so she went to [facility] ER.    7/2020 flank pain evaluated in ER showed   CT 7/19/2020  1.  2 mm distal left ureteral calculus, just above the ureterovesical  junction. Mild left hydronephrosis and left perinephric edema.  2.  Tiny nonobstructing right intrarenal calculi.    Last imaging: ultrasound 8/13/2020 - no hydro  Last Surgery: none for stones    Josefina Walton's diet consists of 2-3 meals per day.    1st meal sometimes skips - usually cereal, bagel  or toast - likes life (with sugar) and frosted flakes  Usually drinks just water.  Never drinks a glass of milk.  Occasional coffee (not daily)  2nd meal sometimes snacks with cheese, crackers or packs a sandwich (turkey/cheese/aecvedo) or left overs.  chipotle on occasion.  If she is at home, spinach salad with cheese, sunflower seeds and balsamic vinaigrette   3rd meal - makes dinner with roommates - chicken, pasta, frozen lasagna, goes out to eat twice per week  Snacks fruit snacks, applesauce, sometimes a little candy, potato chips, tortilla chips and salsa  Does not eat a lot of vegetables - does not like the texture.  Usually dinner does not include vegetables    Fluid intake includes mainly water, rare coffee, occasional soda (sugar sweetened citrus flavor)    Sodium intake is no monitored but also does not add sodium    Calcium intake is some milk with cereal and cheese with lunch and dinner.    Family history is positive for kidney stones in father and paternal grandfather.  Her younger sister has not had kidney stones.    I reviewed 24 hour urine chemstries:          PAST MEDICAL HISTORY:  Reviewed  Kidney stone  No past medical history on file.    PSH:  Reviewed  Dental extraction     MEDICATIONS:  Current Outpatient Medications   Medication Sig Dispense Refill     HYDROcodone-acetaminophen (NORCO) 5-325 MG tablet Take 1 tablet by mouth every 6 hours as needed for severe pain       ibuprofen (ADVIL/MOTRIN) 400 MG tablet Take 1 tablet (400 mg) by mouth every 6 hours as needed for moderate pain       oxyCODONE (OXY-IR) 5 MG capsule Take 1 capsule (5 mg) by mouth every 4 hours as needed for severe pain (Patient not taking: Reported on 8/13/2020) 5 capsule 0      Oral contraceptive    ALLERGIES:    Allergies   Allergen Reactions     Chlorhexidine Rash       REVIEW OF SYSTEMS:  A 10 point review of systems was negative except as noted above.    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  Social History     Socioeconomic History     Marital status: Single     Spouse name: Not on file     Number of children: Not on file     Years of education: Not on file     Highest education level: Not on file   Occupational History     Not on file   Social Needs     Financial resource strain: Not on file     Food insecurity     Worry: Not on file     Inability: Not on file     Transportation needs     Medical: Not on file     Non-medical: Not on file   Tobacco Use     Smoking status: Never Smoker     Smokeless tobacco: Never Used   Substance and Sexual Activity     Alcohol use: Yes     Comment: couple drinks a week     Drug use: Never     Sexual activity: Not on file   Lifestyle     Physical activity     Days per week: Not on file     Minutes per session: Not on file     Stress: Not on file   Relationships     Social connections     Talks on phone: Not on file     Gets together: Not on file     Attends Mandaeism service: Not on file     Active member of club or organization: Not on file     Attends meetings of clubs or organizations: Not on file     Relationship status: Not on file     Intimate partner violence     Fear of current or ex partner: Not on file     Emotionally abused: Not on file     Physically abused: Not on file     Forced sexual activity: Not on file   Other Topics Concern     Not on file   Social History Narrative     Not on file   occasional alcohol on weekends    FAMILY MEDICAL HISTORY:   + kidney stones    PHYSICAL EXAM:   There were no vitals taken for this visit.     GENERAL APPEARANCE: alert and no distress  RESP: normal work of breathing  NEURO: face symmetric, mentation intact and speech normal  Psych: well groomed, affect full, pleasant, normal mental status    LABS:   I reviewed:  Electrolytes/Renal -   Recent Labs   Lab Test 07/20/20  0625 07/19/20  0735    140   POTASSIUM 3.7 4.1   CHLORIDE 110* 106   CO2 22 26   BUN 10 16   CR 0.89 1.22*   GLC 81 97  "  CHERELLE 8.2* 8.5   MAG 2.0  --    PHOS 2.6  --        CBC -   Recent Labs   Lab Test 20  0625 20  0735   WBC 4.7 15.3*   HGB 11.8 13.0    343       LFTs - No lab results found.    Coags - No lab results found.    Iron Panel - No lab results found.    Endocrine - No lab results found.    IMAGIN2020 CT scan  KIDNEYS/BLADDER: Mild left renal enlargement and perinephric edema.  Mild left hydronephrosis. There is a 2 mm distal left ureteral  calculus, just above the ureterovesical junction. There are a couple  of tiny nonobstructing right calyceal tip stones. No right  hydronephrosis. No left intrarenal calculi. The bladder is  decompressed.    Daniela Hill MD         Video Visit Technology for this patient: Patient will try to get into waiting room through My Chart otherwise send to e-mail at: [email or web address]    Josefina Walton is a 19 year old female who is being evaluated via a billable video visit.      The patient has been notified of following:     \"This video visit will be conducted via a call between you and your physician/provider. We have found that certain health care needs can be provided without the need for an in-person physical exam.  This service lets us provide the care you need with a video conversation.  If a prescription is necessary we can send it directly to your pharmacy.  If lab work is needed we can place an order for that and you can then stop by our lab to have the test done at a later time.    Video visits are billed at different rates depending on your insurance coverage.  Please reach out to your insurance provider with any questions.    If during the course of the call the physician/provider feels a video visit is not appropriate, you will not be charged for this service.\"    Patient has given verbal consent for Video visit? Yes  How would you like to obtain your AVS? MyChart  If you are dropped from the video visit, the video invite should be resent " to: Send to e-mail at: [email or web address]  Will anyone else be joining your video visit? No        Video-Visit Details    Type of service:  Video Visit    Start 2:05 pm  End 2:41 PM     Originating Location (pt. Location): Home    Distant Location (provider location):  [facility] NEPHROLOGY [facility]INIC [place]     Platform used for Video Visit: Sarah Hill MD

## 2020-10-06 NOTE — PROGRESS NOTES
"Video Visit Technology for this patient: Patient will try to get into waiting room through My Chart otherwise send to e-mail at: [email or web address]    Josefina Walton is a 19 year old female who is being evaluated via a billable video visit.      The patient has been notified of following:     \"This video visit will be conducted via a call between you and your physician/provider. We have found that certain health care needs can be provided without the need for an in-person physical exam.  This service lets us provide the care you need with a video conversation.  If a prescription is necessary we can send it directly to your pharmacy.  If lab work is needed we can place an order for that and you can then stop by our lab to have the test done at a later time.    Video visits are billed at different rates depending on your insurance coverage.  Please reach out to your insurance provider with any questions.    If during the course of the call the physician/provider feels a video visit is not appropriate, you will not be charged for this service.\"    Patient has given verbal consent for Video visit? Yes  How would you like to obtain your AVS? MyChart  If you are dropped from the video visit, the video invite should be resent to: Send to e-mail at: [email or web address]  Will anyone else be joining your video visit? No        Video-Visit Details    Type of service:  Video Visit    Start 2:05 pm  End 2:41 PM     Originating Location (pt. Location): Home    Distant Location (provider location):  [facility] NEPHROLOGY [facility]IC [place]     Platform used for Video Visit: Sarah Hill MD        "

## 2020-10-07 ENCOUNTER — TELEPHONE (OUTPATIENT)
Dept: NEPHROLOGY | Facility: CLINIC | Age: 20
End: 2020-10-07

## 2020-11-13 DIAGNOSIS — N20.0 KIDNEY STONE: Primary | ICD-10-CM

## 2020-11-19 ENCOUNTER — ANCILLARY PROCEDURE (OUTPATIENT)
Dept: CT IMAGING | Facility: CLINIC | Age: 20
End: 2020-11-19
Attending: UROLOGY
Payer: COMMERCIAL

## 2020-11-19 DIAGNOSIS — N20.0 KIDNEY STONE: ICD-10-CM

## 2020-11-19 PROCEDURE — 74176 CT ABD & PELVIS W/O CONTRAST: CPT | Performed by: RADIOLOGY

## 2020-11-24 NOTE — RESULT ENCOUNTER NOTE
Dear Josefina     Here are your recent CT scan results which are NORMAL.  There are no concerns.  You do not have any kidney stones at this time.    Thank You,    Please let me know if you have any questions!    Petros SAL

## 2021-01-04 ENCOUNTER — HEALTH MAINTENANCE LETTER (OUTPATIENT)
Age: 21
End: 2021-01-04

## 2021-02-03 ENCOUNTER — TRANSFERRED RECORDS (OUTPATIENT)
Dept: MULTI SPECIALTY CLINIC | Facility: CLINIC | Age: 21
End: 2021-02-03

## 2021-02-03 LAB — PAP-ABSTRACT: NORMAL

## 2021-03-12 ENCOUNTER — TRANSFERRED RECORDS (OUTPATIENT)
Dept: HEALTH INFORMATION MANAGEMENT | Facility: CLINIC | Age: 21
End: 2021-03-12

## 2021-04-22 NOTE — PROGRESS NOTES
SUBJECTIVE:                                                   Josefina Walton is a 20 year old female who presents to clinic today for the following health issue(s):  Patient presents with:  Follow Up: Pt had a tampon stuck for 5 days, she was able to remove it but wants to make sure there is no infection. Pt was having some foul watery discharge. Has been getting better but still wants to check it out.      HPI:  New patient to me here today with complaints of foul vaginal discharge and requesting STD testing.  She had a retained tampon in for 5 days but was able to remove it on her own.  She has noticed an improvement in the odor and the amount of discharge since the tampon was removed.  She denies any fever or chills or pelvic pain.    Patient's last menstrual period was 04/14/2021 (exact date)..     Patient is sexually active, No obstetric history on file..  Using oral contraceptives and condoms for contraception.    reports that she has never smoked. She has never used smokeless tobacco.    STD testing offered?  Accepted    Health maintenance updated:  yes    Today's PHQ-2 Score:   PHQ-2 ( 2000 Pf[employer]) 10/6/2020   Q1: Little interest or pleasure in doing things 0   Q2: Feeling down, depressed or hopeless 0   PHQ-2 Score 0   Q1: Little interest or pleasure in doing things Not at all   Q2: Feeling down, depressed or hopeless Not at all   PHQ-2 Score 0     Today's PHQ-9 Score:   PHQ-9 SCORE 4/23/2021   PHQ-9 Total Score 14     Today's SUKHDEV-7 Score:   SUKHDEV-7 SCORE 4/23/2021   Total Score 13       Problem list and histories reviewed & adjusted, as indicated.  Additional history: as documented.    Patient Active Problem List   Diagnosis     Kidney stone     History reviewed. No pertinent surgical history.   Social History     Tobacco Use     Smoking status: Never Smoker     Smokeless tobacco: Never Used   Substance Use Topics     Alcohol use: Yes     Comment: couple drinks a week           Current Outpatient  "Medications   Medication Sig     FLUoxetine (PROZAC) 10 MG capsule Take 10 mg by mouth     metroNIDAZOLE (FLAGYL) 500 MG tablet Take 1 tablet (500 mg) by mouth 2 times daily for 7 days     TRI-LO-SPRINTEC 0.18/0.215/0.25 MG-25 MCG tablet      clonazePAM (KLONOPIN) 0.25 MG TBDP ODT tab Take 0.25 mg by mouth     No current facility-administered medications for this visit.      Allergies   Allergen Reactions     Chlorhexidine Rash       ROS:  12 point review of systems negative other than symptoms noted below or in the HPI.  Genitourinary: Vaginal Discharge and vaginal odor  No urinary frequency or dysuria, bladder or kidney problems, POSITIVE for:, vaginal discharge      OBJECTIVE:     /68   Ht 1.651 m (5' 5\")   Wt 62.3 kg (137 lb 6.4 oz)   LMP 04/14/2021 (Exact Date)   Breastfeeding No   BMI 22.86 kg/m    Body mass index is 22.86 kg/m .    Exam:  Constitutional:  Appearance: Well nourished, well developed alert, in no acute distress  Psychiatric:  Mentation appears normal and affect normal/bright.  Pelvic Exam:  External Genitalia:     Normal appearance for age, no discharge present, no tenderness present, no inflammatory lesions present, color normal, slightly swollen and red  Vagina:     Normal vaginal vault without central or paravaginal defects, moderate amount of creamy yellow discharge present, no inflammatory lesions present, no masses present  Bladder:     Nontender to palpation  Urethra:   Urethral Body:  Urethra palpation normal, urethra structural support normal   Urethral Meatus:  No erythema or lesions present  Cervix:     Appearance healthy, no lesions present, nontender to palpation, no bleeding present  Uterus:     Uterus: firm, normal sized and nontender, retroverted in position.   Adnexa:     No adnexal tenderness present, no adnexal masses present  Perineum:     Perineum within normal limits, no evidence of trauma, no rashes or skin lesions present  Anus:     Anus within normal limits, " no hemorrhoids present  Inguinal Lymph Nodes:     No lymphadenopathy present  Pubic Hair:     Normal pubic hair distribution for age  Genitalia and Groin:     No rashes present, no lesions present, no areas of discoloration, no masses present       In-Clinic Test Results:  Results for orders placed or performed in visit on 04/23/21 (from the past 24 hour(s))   Wet prep    Specimen: Vagina   Result Value Ref Range    Specimen Description Vagina     Wet Prep No Trichomonas seen     Wet Prep Few  Clue cells seen   (A)     Wet Prep No yeast seen     Wet Prep Moderate  WBC'S seen          ASSESSMENT/PLAN:                                                        ICD-10-CM    1. Vaginal discharge  N89.8 Wet prep     metroNIDAZOLE (FLAGYL) 500 MG tablet   2. Screen for STD (sexually transmitted disease)  Z11.3 Neisseria gonorrhoeae PCR   3. Screening for chlamydial disease  Z11.8 Chlamydia trachomatis PCR   4. Retained foreign body of vagina, sequela  T19.2XXS        There are no Patient Instructions on file for this visit.    20-year-old female with vaginal discharge and odor following the removal of a retained tampon.  STD testing was completed today.  Wet prep: + clue cells  We will cover her with antibiotics and encouraged her to do bathtub soaks.    EUGENE Ronquillo CNP  [facility] [place]

## 2021-04-23 ENCOUNTER — OFFICE VISIT (OUTPATIENT)
Dept: OBGYN | Facility: CLINIC | Age: 21
End: 2021-04-23
Payer: COMMERCIAL

## 2021-04-23 VITALS
BODY MASS INDEX: 22.89 KG/M2 | DIASTOLIC BLOOD PRESSURE: 68 MMHG | WEIGHT: 137.4 LBS | SYSTOLIC BLOOD PRESSURE: 112 MMHG | HEIGHT: 65 IN

## 2021-04-23 DIAGNOSIS — T19.2XXS: ICD-10-CM

## 2021-04-23 DIAGNOSIS — N89.8 VAGINAL DISCHARGE: Primary | ICD-10-CM

## 2021-04-23 DIAGNOSIS — Z11.8 SCREENING FOR CHLAMYDIAL DISEASE: ICD-10-CM

## 2021-04-23 DIAGNOSIS — Z11.3 SCREEN FOR STD (SEXUALLY TRANSMITTED DISEASE): ICD-10-CM

## 2021-04-23 LAB
SPECIMEN SOURCE: ABNORMAL
WET PREP SPEC: ABNORMAL

## 2021-04-23 PROCEDURE — 99203 OFFICE O/P NEW LOW 30 MIN: CPT | Performed by: NURSE PRACTITIONER

## 2021-04-23 PROCEDURE — 87491 CHLMYD TRACH DNA AMP PROBE: CPT | Performed by: NURSE PRACTITIONER

## 2021-04-23 PROCEDURE — 87210 SMEAR WET MOUNT SALINE/INK: CPT | Performed by: NURSE PRACTITIONER

## 2021-04-23 PROCEDURE — 87591 N.GONORRHOEAE DNA AMP PROB: CPT | Performed by: NURSE PRACTITIONER

## 2021-04-23 RX ORDER — METRONIDAZOLE 500 MG/1
500 TABLET ORAL 2 TIMES DAILY
Qty: 14 TABLET | Refills: 0 | Status: SHIPPED | OUTPATIENT
Start: 2021-04-23 | End: 2021-04-30

## 2021-04-23 RX ORDER — FLUOXETINE 10 MG/1
10 CAPSULE ORAL
COMMUNITY
Start: 2021-04-19 | End: 2021-09-08

## 2021-04-23 RX ORDER — CLONAZEPAM 0.25 MG/1
0.25 TABLET, ORALLY DISINTEGRATING ORAL
COMMUNITY
Start: 2021-03-19

## 2021-04-23 ASSESSMENT — ANXIETY QUESTIONNAIRES
2. NOT BEING ABLE TO STOP OR CONTROL WORRYING: MORE THAN HALF THE DAYS
6. BECOMING EASILY ANNOYED OR IRRITABLE: SEVERAL DAYS
IF YOU CHECKED OFF ANY PROBLEMS ON THIS QUESTIONNAIRE, HOW DIFFICULT HAVE THESE PROBLEMS MADE IT FOR YOU TO DO YOUR WORK, TAKE CARE OF THINGS AT HOME, OR GET ALONG WITH OTHER PEOPLE: VERY DIFFICULT
5. BEING SO RESTLESS THAT IT IS HARD TO SIT STILL: MORE THAN HALF THE DAYS
7. FEELING AFRAID AS IF SOMETHING AWFUL MIGHT HAPPEN: SEVERAL DAYS
3. WORRYING TOO MUCH ABOUT DIFFERENT THINGS: MORE THAN HALF THE DAYS
GAD7 TOTAL SCORE: 13
1. FEELING NERVOUS, ANXIOUS, OR ON EDGE: NEARLY EVERY DAY

## 2021-04-23 ASSESSMENT — PATIENT HEALTH QUESTIONNAIRE - PHQ9
5. POOR APPETITE OR OVEREATING: MORE THAN HALF THE DAYS
SUM OF ALL RESPONSES TO PHQ QUESTIONS 1-9: 14

## 2021-04-23 ASSESSMENT — MIFFLIN-ST. JEOR: SCORE: 1389.12

## 2021-04-23 NOTE — NURSING NOTE
Please abstract the following data from this visit with this patient into the appropriate field in Epic:    Other Tests found in the patient's chart through Chart Review/Care Everywhere:    Pap smear done by this group [facility] on this date: 02/03/2021    Praveena Jimenez MA on 4/23/2021 at 11:08 AM

## 2021-04-23 NOTE — Clinical Note
Please abstract the following data from this visit with this patient into the appropriate field in Epic:    Other Tests found in the patient's chart through Chart Review/Care Everywhere:    Pap smear done by this group [facility] on this date: 02/03/2021    Praveena Jimenez MA on 4/23/2021 at 11:08 AM

## 2021-04-24 LAB
C TRACH DNA SPEC QL NAA+PROBE: NEGATIVE
N GONORRHOEA DNA SPEC QL NAA+PROBE: NEGATIVE
SPECIMEN SOURCE: NORMAL
SPECIMEN SOURCE: NORMAL

## 2021-04-24 ASSESSMENT — ANXIETY QUESTIONNAIRES: GAD7 TOTAL SCORE: 13

## 2021-04-26 NOTE — RESULT ENCOUNTER NOTE
Josefina      Your STD results are negative.  If further questions please give me a call.    Suzy Sullivan RNC

## 2021-09-08 ENCOUNTER — OFFICE VISIT (OUTPATIENT)
Dept: OBGYN | Facility: CLINIC | Age: 21
End: 2021-09-08
Payer: COMMERCIAL

## 2021-09-08 VITALS
BODY MASS INDEX: 23.32 KG/M2 | WEIGHT: 140 LBS | HEART RATE: 72 BPM | HEIGHT: 65 IN | SYSTOLIC BLOOD PRESSURE: 108 MMHG | DIASTOLIC BLOOD PRESSURE: 80 MMHG

## 2021-09-08 DIAGNOSIS — N89.8 VAGINAL DISCHARGE: ICD-10-CM

## 2021-09-08 DIAGNOSIS — Z11.3 SCREEN FOR STD (SEXUALLY TRANSMITTED DISEASE): ICD-10-CM

## 2021-09-08 DIAGNOSIS — Z11.8 SCREENING FOR CHLAMYDIAL DISEASE: ICD-10-CM

## 2021-09-08 DIAGNOSIS — R30.0 DYSURIA: Primary | ICD-10-CM

## 2021-09-08 LAB
ALBUMIN UR-MCNC: 30 MG/DL
APPEARANCE UR: CLEAR
BACTERIA #/AREA URNS HPF: ABNORMAL /HPF
BILIRUB UR QL STRIP: NEGATIVE
COLOR UR AUTO: YELLOW
GLUCOSE UR STRIP-MCNC: NEGATIVE MG/DL
HGB UR QL STRIP: ABNORMAL
KETONES UR STRIP-MCNC: NEGATIVE MG/DL
LEUKOCYTE ESTERASE UR QL STRIP: ABNORMAL
NITRATE UR QL: POSITIVE
PH UR STRIP: 6.5 [PH] (ref 5–7)
RBC #/AREA URNS AUTO: ABNORMAL /HPF
SP GR UR STRIP: 1.02 (ref 1–1.03)
SQUAMOUS #/AREA URNS AUTO: ABNORMAL /LPF
UROBILINOGEN UR STRIP-ACNC: 2 E.U./DL
WBC #/AREA URNS AUTO: ABNORMAL /HPF

## 2021-09-08 PROCEDURE — 86780 TREPONEMA PALLIDUM: CPT | Performed by: NURSE PRACTITIONER

## 2021-09-08 PROCEDURE — 99213 OFFICE O/P EST LOW 20 MIN: CPT | Performed by: NURSE PRACTITIONER

## 2021-09-08 PROCEDURE — 87186 SC STD MICRODIL/AGAR DIL: CPT | Performed by: NURSE PRACTITIONER

## 2021-09-08 PROCEDURE — 86695 HERPES SIMPLEX TYPE 1 TEST: CPT | Performed by: NURSE PRACTITIONER

## 2021-09-08 PROCEDURE — 86696 HERPES SIMPLEX TYPE 2 TEST: CPT | Performed by: NURSE PRACTITIONER

## 2021-09-08 PROCEDURE — 87491 CHLMYD TRACH DNA AMP PROBE: CPT | Performed by: NURSE PRACTITIONER

## 2021-09-08 PROCEDURE — 81001 URINALYSIS AUTO W/SCOPE: CPT | Performed by: NURSE PRACTITIONER

## 2021-09-08 PROCEDURE — 36415 COLL VENOUS BLD VENIPUNCTURE: CPT | Performed by: NURSE PRACTITIONER

## 2021-09-08 PROCEDURE — 87205 SMEAR GRAM STAIN: CPT | Performed by: NURSE PRACTITIONER

## 2021-09-08 PROCEDURE — 87591 N.GONORRHOEAE DNA AMP PROB: CPT | Performed by: NURSE PRACTITIONER

## 2021-09-08 PROCEDURE — 87086 URINE CULTURE/COLONY COUNT: CPT | Performed by: NURSE PRACTITIONER

## 2021-09-08 PROCEDURE — 86705 HEP B CORE ANTIBODY IGM: CPT | Performed by: NURSE PRACTITIONER

## 2021-09-08 RX ORDER — SULFAMETHOXAZOLE/TRIMETHOPRIM 800-160 MG
1 TABLET ORAL 2 TIMES DAILY
Qty: 10 TABLET | Refills: 0 | Status: SHIPPED | OUTPATIENT
Start: 2021-09-08 | End: 2021-09-13

## 2021-09-08 ASSESSMENT — MIFFLIN-ST. JEOR: SCORE: 1400.92

## 2021-09-08 NOTE — PROGRESS NOTES
SUBJECTIVE:                                                   Josefina Walton is a 20 year old female who presents to clinic today for the following health issue(s):  Patient presents with:  Urinary Problem: Patient states she has hx of kidney stones and kidney infection. patient stated her sx started yesterday. She has burning sensation when she urinates and urgency and flank pain.       HPI: Patient is c/o dysuria and urgency with urination.  She does have some mild back pain, which she has a history of passing kidney stones.  She denies any fever or nausea.  She has taken Azo yesterday.  She also notices a foul odor when urinates, could be vaginal.      Patient's last menstrual period was 2021..     Patient is sexually active, .  Using oral contraceptives and condoms for contraception.    reports that she has never smoked. She has never used smokeless tobacco.    STD testing offered?  Accepted    Health maintenance updated:  yes    Today's PHQ-2 Score:   PHQ-2 (  [employer]) 10/6/2020   Q1: Little interest or pleasure in doing things 0   Q2: Feeling down, depressed or hopeless 0   PHQ-2 Score 0   Q1: Little interest or pleasure in doing things Not at all   Q2: Feeling down, depressed or hopeless Not at all   PHQ-2 Score 0     Today's PHQ-9 Score:   PHQ-9 SCORE 2021   PHQ-9 Total Score 14     Today's SUKHDEV-7 Score:   SUKHDEV-7 SCORE 2021   Total Score 13       Problem list and histories reviewed & adjusted, as indicated.  Additional history: as documented.    Patient Active Problem List   Diagnosis     Kidney stone     History reviewed. No pertinent surgical history.   Social History     Tobacco Use     Smoking status: Never Smoker     Smokeless tobacco: Never Used   Substance Use Topics     Alcohol use: Yes     Comment: couple drinks a week           Current Outpatient Medications   Medication Sig     clonazePAM (KLONOPIN) 0.25 MG TBDP ODT tab Take 0.25 mg by mouth     FLUoxetine (PROZAC) 20 MG  "capsule      sulfamethoxazole-trimethoprim (BACTRIM DS) 800-160 MG tablet Take 1 tablet by mouth 2 times daily for 5 days     TRI-LO-SPRINTEC 0.18/0.215/0.25 MG-25 MCG tablet      No current facility-administered medications for this visit.     Allergies   Allergen Reactions     Chlorhexidine Rash       ROS:  12 point review of systems negative other than symptoms noted below or in the HPI.  Genitourinary: Painful Urination and Urgency  Normal menstrual cycles, vaginal discharge      OBJECTIVE:     /80 (BP Location: Right arm, Patient Position: Sitting, Cuff Size: Adult Regular)   Pulse 72   Ht 1.651 m (5' 5\")   Wt 63.5 kg (140 lb)   LMP 09/01/2021   BMI 23.30 kg/m    Body mass index is 23.3 kg/m .    Exam:  Constitutional:  Appearance: Well nourished, well developed alert, in no acute distress  Neurologic:  Mental Status:  Oriented X3.  Normal strength and tone, sensory exam grossly normal, mentation intact and speech normal.    Psychiatric:  Mentation appears normal and affect normal/bright.  Pelvic Exam:  External Genitalia:     Normal appearance for age, no discharge present, no tenderness present, no inflammatory lesions present, color normal  Vagina:     Normal vaginal vault without central or paravaginal defects, moderate amount of cream discharge present, no inflammatory lesions present, no masses present  Vaginal culture collected and sent.  Chlamydia/GC done.  Bladder:     Nontender to palpation  Urethra:   Urethral Body:  Urethra palpation normal, urethra structural support normal   Urethral Meatus:  No erythema or lesions present  Cervix:     Appearance healthy, no lesions present, nontender to palpation, no bleeding present  Uterus:     Uterus: firm, normal sized and nontender, anteverted in position.   Adnexa:     No adnexal tenderness present, no adnexal masses present  Perineum:     Perineum within normal limits, no evidence of trauma, no rashes or skin lesions present  Anus:     Anus " within normal limits, no hemorrhoids present  Inguinal Lymph Nodes:     No lymphadenopathy present  Pubic Hair:     Normal pubic hair distribution for age  Genitalia and Groin:     No rashes present, no lesions present, no areas of discoloration, no masses present   Mild flank pain noted    In-Clinic Test Results:  Results for orders placed or performed in visit on 09/08/21 (from the past 24 hour(s))   UA with Microscopic - lab collect   Result Value Ref Range    Color Urine Yellow Colorless, Straw, Light Yellow, Yellow    Appearance Urine Clear Clear    Glucose Urine Negative Negative mg/dL    Bilirubin Urine Negative Negative    Ketones Urine Negative Negative mg/dL    Specific Gravity Urine 1.020 1.003 - 1.035    Blood Urine Trace (A) Negative    pH Urine 6.5 5.0 - 7.0    Protein Albumin Urine 30  (A) Negative mg/dL    Urobilinogen Urine 2.0 (A) 0.2, 1.0 E.U./dL    Nitrite Urine Positive (A) Negative    Leukocyte Esterase Urine Small (A) Negative   Urine Microscopic Exam   Result Value Ref Range    Bacteria Urine Few (A) None Seen /HPF    RBC Urine 2-5 (A) 0-2 /HPF /HPF    WBC Urine 10-25 (A) 0-5 /HPF /HPF    Squamous Epithelials Urine Few (A) None Seen /LPF       ASSESSMENT/PLAN:                                                        ICD-10-CM    1. Dysuria  R30.0 UA with Microscopic - lab collect     UA with Microscopic - lab collect     sulfamethoxazole-trimethoprim (BACTRIM DS) 800-160 MG tablet     Urine Culture     Urine Culture   2. Screen for STD (sexually transmitted disease)  Z11.3 Neisseria gonorrhoeae PCR     Treponema Abs w Reflex to RPR and Titer     Herpes Simplex Virus 1 and 2 IgG     Hepatitis B core antibody IgM     Treponema Abs w Reflex to RPR and Titer     Herpes Simplex Virus 1 and 2 IgG     Hepatitis B core antibody IgM   3. Screening for chlamydial disease  Z11.8 Chlamydia trachomatis PCR   4. Vaginal discharge  N89.8 Bacterial Vaginosis Smear     Fungal or Yeast Culture Routine          Patient to start on Bactrim.  Will send a UC, notify patient with lab results when available.  She does have a urologist and if symptoms get worse and ? Kidney stones she will call them.  Push water, cranberry juice.  Return prn.    EUGENE Montes CNP [facility]

## 2021-09-09 LAB
HBV CORE IGM SERPL QL IA: NONREACTIVE
HSV1 IGG SERPL QL IA: 0.11 INDEX
HSV1 IGG SERPL QL IA: NORMAL
HSV2 IGG SERPL QL IA: 0.06 INDEX
HSV2 IGG SERPL QL IA: NORMAL
NUGENT SCORE: 0
T PALLIDUM AB SER QL: NONREACTIVE
WHITE BLOOD CELLS: NORMAL

## 2021-09-10 LAB
C TRACH DNA SPEC QL NAA+PROBE: NEGATIVE
N GONORRHOEA DNA SPEC QL NAA+PROBE: NEGATIVE

## 2021-09-11 LAB — BACTERIA UR CULT: ABNORMAL

## 2021-09-13 NOTE — RESULT ENCOUNTER NOTE
Josefina      Your STD results are negative.  Your urine culture was positive for a infection.  The sulfa medication I prescribed should be helping with symptoms, if not please let me know.  If further questions please give me a call.    Suzy Sullivan RNC

## 2021-10-11 ENCOUNTER — HEALTH MAINTENANCE LETTER (OUTPATIENT)
Age: 21
End: 2021-10-11

## 2021-12-02 ENCOUNTER — E-VISIT (OUTPATIENT)
Dept: OBGYN | Facility: CLINIC | Age: 21
End: 2021-12-02
Payer: COMMERCIAL

## 2021-12-02 DIAGNOSIS — N39.0 ACUTE UTI (URINARY TRACT INFECTION): Primary | ICD-10-CM

## 2021-12-02 PROCEDURE — 99421 OL DIG E/M SVC 5-10 MIN: CPT | Performed by: NURSE PRACTITIONER

## 2021-12-02 RX ORDER — NITROFURANTOIN 25; 75 MG/1; MG/1
100 CAPSULE ORAL 2 TIMES DAILY
Qty: 10 CAPSULE | Refills: 0 | Status: SHIPPED | OUTPATIENT
Start: 2021-12-02 | End: 2021-12-07

## 2021-12-02 NOTE — PATIENT INSTRUCTIONS
Dear Josefina Walton    After reviewing your responses, I've been able to diagnose you with a urinary tract infection, which is a common infection of the bladder with bacteria.  This is not a sexually transmitted infection, though urinating immediately after intercourse can help prevent infections.  Drinking lots of fluids is also helpful to clear your current infection and prevent the next one.      I have sent a prescription for antibiotics to your pharmacy to treat this infection.    It is important that you take all of your prescribed medication even if your symptoms are improving after a few doses.  Taking all of your medicine helps prevent the symptoms from returning.     If your symptoms worsen, you develop pain in your back or stomach, develop fevers, or are not improving in 5 days, please contact your primary care provider for an appointment or visit any of our convenient Walk-in or Urgent Care Centers to be seen, which can be found on our website here.    Thanks again for choosing us as your health care partner,    EUGENE Ronquillo CNP

## 2022-01-30 ENCOUNTER — HEALTH MAINTENANCE LETTER (OUTPATIENT)
Age: 22
End: 2022-01-30

## 2022-09-24 ENCOUNTER — HEALTH MAINTENANCE LETTER (OUTPATIENT)
Age: 22
End: 2022-09-24

## 2022-09-26 NOTE — NURSING NOTE
Chief Complaint   Patient presents with     Consult     review [employer] results       Patient Active Problem List   Diagnosis     Pyelonephritis       Allergies   Allergen Reactions     Chlorhexidine Rash       Current Outpatient Medications   Medication Sig Dispense Refill     TRI-LO-SPRINTEC 0.18/0.215/0.25 MG-25 MCG tablet        HYDROcodone-acetaminophen (NORCO) 5-325 MG tablet Take 1 tablet by mouth every 6 hours as needed for severe pain       ibuprofen (ADVIL/MOTRIN) 400 MG tablet Take 1 tablet (400 mg) by mouth every 6 hours as needed for moderate pain       oxyCODONE (OXY-IR) 5 MG capsule Take 1 capsule (5 mg) by mouth every 4 hours as needed for severe pain (Patient not taking: Reported on 8/13/2020) 5 capsule 0       Social History     Tobacco Use     Smoking status: Never Smoker     Smokeless tobacco: Never Used   Substance Use Topics     Alcohol use: Yes     Comment: couple drinks a week     Drug use: Never       Shilpi Fernandez LPN  10/6/2020  1:24 PM    [employer] order form filled out and faxed to [employer] at [phone].    Shilpi Fernandez LPN  10/07/20  11:50 AM       Cephalexin Counseling: I counseled the patient regarding use of cephalexin as an antibiotic for prophylactic and/or therapeutic purposes. Cephalexin (commonly prescribed under brand name Keflex) is a cephalosporin antibiotic which is active against numerous classes of bacteria, including most skin bacteria. Side effects may include nausea, diarrhea, gastrointestinal upset, rash, hives, yeast infections, and in rare cases, hepatitis, kidney disease, seizures, fever, confusion, neurologic symptoms, and others. Patients with severe allergies to penicillin medications are cautioned that there is about a 10% incidence of cross-reactivity with cephalosporins. When possible, patients with penicillin allergies should use alternatives to cephalosporins for antibiotic therapy.

## 2023-05-08 ENCOUNTER — HEALTH MAINTENANCE LETTER (OUTPATIENT)
Age: 23
End: 2023-05-08

## 2024-07-14 ENCOUNTER — HEALTH MAINTENANCE LETTER (OUTPATIENT)
Age: 24
End: 2024-07-14

## 2025-04-30 ENCOUNTER — TRANSCRIBE ORDERS (OUTPATIENT)
Dept: OTHER | Age: 25
End: 2025-04-30

## 2025-04-30 DIAGNOSIS — R87.619 ABNORMAL PAP SMEAR OF CERVIX: Primary | ICD-10-CM

## 2025-05-01 ENCOUNTER — PATIENT OUTREACH (OUTPATIENT)
Dept: CARE COORDINATION | Facility: CLINIC | Age: 25
End: 2025-05-01
Payer: COMMERCIAL

## 2025-05-05 ENCOUNTER — PATIENT OUTREACH (OUTPATIENT)
Dept: CARE COORDINATION | Facility: CLINIC | Age: 25
End: 2025-05-05
Payer: COMMERCIAL

## 2025-06-03 DIAGNOSIS — Z36.9 ENCOUNTER FOR ANTENATAL SCREENING: Primary | ICD-10-CM

## 2025-06-18 NOTE — PROGRESS NOTES
[facility]  OB/GYN Clinic Note    INDICATIONS:                                                    Is a pregnancy test required: Yes.  Was it positive or negative?  Negative  Was a consent obtained?  Yes    Josefina Walton, is a 24 year old female, who had a recent LGSIL pap and ASC-H.  HPV Negative No prior history of abnormal pap. Here today for colposcopy. Discussed indication, risks of infection and bleeding.    Her last pap was:  ASC-H and LSIL, HR HPV neg     PROCEDURE:                                                      The RBA of colposcopy were explained to the patient and she wishes to proceed. ID confirmed and consent signed.     Ext: unremarkable  EGBUS:WNL  Vagina:No lesions    Cervix:  Stained with acetic acid   Transformation zone is fully visualized  AWE: cuffed glands from 3-7 o'clock  Atypical vessels: none.  Biopsies:  Biopsy at 4 o'clock and ECC were performed  and hemastasis achieved with silver nitrate and Monsel's. Patient tolerated the procedure well.  Patient very anxious during visit with moderate pain.   Impression: JESSICA 1     POST PROCEDURE:                                                      Josefina Walton is a 24 year old  with ASC-H on pap  The significance of pap smear and its abnormality and HPV discussed at length. Extensive counseling provided as patient was scheduled outside of Pap hub.   Discussed methods to minimize risk of cervical cancer: condom use, tobacco use, HPV vaccine.  Follow-up pending results. Discussed option of anesthesia if colposcopy or other procedure is needed in the future.     Maki Sevilla MD, S  25

## 2025-06-19 ENCOUNTER — LAB (OUTPATIENT)
Dept: LAB | Facility: CLINIC | Age: 25
End: 2025-06-19
Payer: COMMERCIAL

## 2025-06-19 ENCOUNTER — OFFICE VISIT (OUTPATIENT)
Dept: OBGYN | Facility: CLINIC | Age: 25
End: 2025-06-19
Payer: COMMERCIAL

## 2025-06-19 VITALS
BODY MASS INDEX: 25.22 KG/M2 | SYSTOLIC BLOOD PRESSURE: 108 MMHG | WEIGHT: 151.4 LBS | DIASTOLIC BLOOD PRESSURE: 80 MMHG | HEIGHT: 65 IN

## 2025-06-19 DIAGNOSIS — Z36.9 ENCOUNTER FOR ANTENATAL SCREENING: ICD-10-CM

## 2025-06-19 DIAGNOSIS — R87.612 LOW GRADE SQUAMOUS INTRAEPITHELIAL LESION ON CYTOLOGIC SMEAR OF CERVIX (LGSIL): ICD-10-CM

## 2025-06-19 DIAGNOSIS — R87.611 ATYPICAL SQUAMOUS CELLS CANNOT EXCLUDE HIGH GRADE SQUAMOUS INTRAEPITHELIAL LESION ON CYTOLOGIC SMEAR OF CERVIX (ASC-H): Primary | ICD-10-CM

## 2025-06-19 LAB — HCG UR QL: NEGATIVE

## 2025-06-20 PROBLEM — R87.611 PAP SMEAR OF CERVIX WITH ASCUS, CANNOT EXCLUDE HGSIL: Status: ACTIVE | Noted: 2025-04-10

## 2025-06-24 ENCOUNTER — RESULTS FOLLOW-UP (OUTPATIENT)
Dept: OBGYN | Facility: CLINIC | Age: 25
End: 2025-06-24

## 2025-06-24 LAB
PATH REPORT.COMMENTS IMP SPEC: NORMAL
PATH REPORT.COMMENTS IMP SPEC: NORMAL
PATH REPORT.FINAL DX SPEC: NORMAL
PATH REPORT.GROSS SPEC: NORMAL
PATH REPORT.MICROSCOPIC SPEC OTHER STN: NORMAL
PATH REPORT.MICROSCOPIC SPEC OTHER STN: NORMAL
PATH REPORT.RELEVANT HX SPEC: NORMAL
PHOTO IMAGE: NORMAL

## 2025-06-25 ENCOUNTER — PATIENT OUTREACH (OUTPATIENT)
Dept: OBGYN | Facility: CLINIC | Age: 25
End: 2025-06-25
Payer: COMMERCIAL

## 2025-06-28 ENCOUNTER — HEALTH MAINTENANCE LETTER (OUTPATIENT)
Age: 25
End: 2025-06-28